# Patient Record
Sex: MALE | Race: WHITE | NOT HISPANIC OR LATINO | Employment: OTHER | URBAN - METROPOLITAN AREA
[De-identification: names, ages, dates, MRNs, and addresses within clinical notes are randomized per-mention and may not be internally consistent; named-entity substitution may affect disease eponyms.]

---

## 2018-05-21 PROCEDURE — 88305 TISSUE EXAM BY PATHOLOGIST: CPT | Performed by: PATHOLOGY

## 2018-05-22 ENCOUNTER — LAB REQUISITION (OUTPATIENT)
Dept: LAB | Facility: HOSPITAL | Age: 73
End: 2018-05-22
Payer: MEDICARE

## 2018-05-22 DIAGNOSIS — D48.5 NEOPLASM OF UNCERTAIN BEHAVIOR OF SKIN: ICD-10-CM

## 2018-06-18 PROCEDURE — 88305 TISSUE EXAM BY PATHOLOGIST: CPT | Performed by: PATHOLOGY

## 2018-06-19 ENCOUNTER — LAB REQUISITION (OUTPATIENT)
Dept: LAB | Facility: HOSPITAL | Age: 73
End: 2018-06-19
Payer: MEDICARE

## 2018-06-19 DIAGNOSIS — C44.41 BASAL CELL CARCINOMA OF SKIN OF SCALP AND NECK: ICD-10-CM

## 2019-09-25 ENCOUNTER — APPOINTMENT (OUTPATIENT)
Dept: LAB | Facility: HOSPITAL | Age: 74
End: 2019-09-25
Payer: MEDICARE

## 2019-09-25 ENCOUNTER — TRANSCRIBE ORDERS (OUTPATIENT)
Dept: ADMINISTRATIVE | Facility: HOSPITAL | Age: 74
End: 2019-09-25

## 2019-09-25 DIAGNOSIS — R33.8 BENIGN LOCALIZED HYPERPLASIA OF PROSTATE WITH URINARY RETENTION: ICD-10-CM

## 2019-09-25 DIAGNOSIS — R35.1 NOCTURIA: Primary | ICD-10-CM

## 2019-09-25 DIAGNOSIS — R35.1 NOCTURIA: ICD-10-CM

## 2019-09-25 DIAGNOSIS — E78.2 MIXED HYPERLIPIDEMIA: ICD-10-CM

## 2019-09-25 DIAGNOSIS — E11.9 TYPE 2 DIABETES MELLITUS WITHOUT COMPLICATION, WITHOUT LONG-TERM CURRENT USE OF INSULIN (HCC): ICD-10-CM

## 2019-09-25 DIAGNOSIS — N40.1 BENIGN LOCALIZED HYPERPLASIA OF PROSTATE WITH URINARY RETENTION: ICD-10-CM

## 2019-09-25 DIAGNOSIS — E03.9 HYPOTHYROIDISM, UNSPECIFIED TYPE: ICD-10-CM

## 2019-09-25 DIAGNOSIS — I10 ESSENTIAL HYPERTENSION: ICD-10-CM

## 2019-09-25 LAB
ALBUMIN SERPL BCP-MCNC: 3.4 G/DL (ref 3.5–5)
ALP SERPL-CCNC: 78 U/L (ref 46–116)
ALT SERPL W P-5'-P-CCNC: 41 U/L (ref 12–78)
ANION GAP SERPL CALCULATED.3IONS-SCNC: 5 MMOL/L (ref 4–13)
AST SERPL W P-5'-P-CCNC: 55 U/L (ref 5–45)
BASOPHILS # BLD AUTO: 0.04 THOUSANDS/ΜL (ref 0–0.1)
BASOPHILS NFR BLD AUTO: 1 % (ref 0–1)
BILIRUB SERPL-MCNC: 0.74 MG/DL (ref 0.2–1)
BUN SERPL-MCNC: 16 MG/DL (ref 5–25)
CALCIUM SERPL-MCNC: 8.8 MG/DL (ref 8.3–10.1)
CHLORIDE SERPL-SCNC: 110 MMOL/L (ref 100–108)
CHOLEST SERPL-MCNC: 139 MG/DL (ref 50–200)
CO2 SERPL-SCNC: 29 MMOL/L (ref 21–32)
CREAT SERPL-MCNC: 1.01 MG/DL (ref 0.6–1.3)
EOSINOPHIL # BLD AUTO: 0.34 THOUSAND/ΜL (ref 0–0.61)
EOSINOPHIL NFR BLD AUTO: 6 % (ref 0–6)
ERYTHROCYTE [DISTWIDTH] IN BLOOD BY AUTOMATED COUNT: 15.2 % (ref 11.6–15.1)
EST. AVERAGE GLUCOSE BLD GHB EST-MCNC: 154 MG/DL
GFR SERPL CREATININE-BSD FRML MDRD: 74 ML/MIN/1.73SQ M
GLUCOSE P FAST SERPL-MCNC: 142 MG/DL (ref 65–99)
HBA1C MFR BLD: 7 % (ref 4.2–6.3)
HCT VFR BLD AUTO: 38.6 % (ref 36.5–49.3)
HDLC SERPL-MCNC: 33 MG/DL (ref 40–60)
HGB BLD-MCNC: 12.4 G/DL (ref 12–17)
IMM GRANULOCYTES # BLD AUTO: 0.02 THOUSAND/UL (ref 0–0.2)
IMM GRANULOCYTES NFR BLD AUTO: 0 % (ref 0–2)
LDLC SERPL CALC-MCNC: 71 MG/DL (ref 0–100)
LYMPHOCYTES # BLD AUTO: 1.27 THOUSANDS/ΜL (ref 0.6–4.47)
LYMPHOCYTES NFR BLD AUTO: 24 % (ref 14–44)
MCH RBC QN AUTO: 31.2 PG (ref 26.8–34.3)
MCHC RBC AUTO-ENTMCNC: 32.1 G/DL (ref 31.4–37.4)
MCV RBC AUTO: 97 FL (ref 82–98)
MONOCYTES # BLD AUTO: 0.71 THOUSAND/ΜL (ref 0.17–1.22)
MONOCYTES NFR BLD AUTO: 13 % (ref 4–12)
NEUTROPHILS # BLD AUTO: 2.98 THOUSANDS/ΜL (ref 1.85–7.62)
NEUTS SEG NFR BLD AUTO: 56 % (ref 43–75)
NONHDLC SERPL-MCNC: 106 MG/DL
NRBC BLD AUTO-RTO: 0 /100 WBCS
PLATELET # BLD AUTO: 86 THOUSANDS/UL (ref 149–390)
PMV BLD AUTO: 11.2 FL (ref 8.9–12.7)
POTASSIUM SERPL-SCNC: 4.3 MMOL/L (ref 3.5–5.3)
PROT SERPL-MCNC: 7.3 G/DL (ref 6.4–8.2)
PSA SERPL-MCNC: 0.2 NG/ML (ref 0–4)
RBC # BLD AUTO: 3.97 MILLION/UL (ref 3.88–5.62)
SODIUM SERPL-SCNC: 144 MMOL/L (ref 136–145)
TRIGL SERPL-MCNC: 173 MG/DL
TSH SERPL DL<=0.05 MIU/L-ACNC: 3.51 UIU/ML (ref 0.36–3.74)
WBC # BLD AUTO: 5.36 THOUSAND/UL (ref 4.31–10.16)

## 2019-09-25 PROCEDURE — 85025 COMPLETE CBC W/AUTO DIFF WBC: CPT

## 2019-09-25 PROCEDURE — 84443 ASSAY THYROID STIM HORMONE: CPT

## 2019-09-25 PROCEDURE — 80053 COMPREHEN METABOLIC PANEL: CPT

## 2019-09-25 PROCEDURE — 83036 HEMOGLOBIN GLYCOSYLATED A1C: CPT

## 2019-09-25 PROCEDURE — 84153 ASSAY OF PSA TOTAL: CPT

## 2019-09-25 PROCEDURE — 80061 LIPID PANEL: CPT

## 2019-09-25 PROCEDURE — 36415 COLL VENOUS BLD VENIPUNCTURE: CPT

## 2021-03-10 ENCOUNTER — IMMUNIZATIONS (OUTPATIENT)
Dept: FAMILY MEDICINE CLINIC | Facility: HOSPITAL | Age: 76
End: 2021-03-10

## 2021-03-10 DIAGNOSIS — Z23 ENCOUNTER FOR IMMUNIZATION: Primary | ICD-10-CM

## 2021-03-10 PROCEDURE — 0011A SARS-COV-2 / COVID-19 MRNA VACCINE (MODERNA) 100 MCG: CPT

## 2021-03-10 PROCEDURE — 91301 SARS-COV-2 / COVID-19 MRNA VACCINE (MODERNA) 100 MCG: CPT

## 2021-04-07 ENCOUNTER — IMMUNIZATIONS (OUTPATIENT)
Dept: FAMILY MEDICINE CLINIC | Facility: HOSPITAL | Age: 76
End: 2021-04-07

## 2021-04-07 DIAGNOSIS — Z23 ENCOUNTER FOR IMMUNIZATION: Primary | ICD-10-CM

## 2021-04-07 PROCEDURE — 91301 SARS-COV-2 / COVID-19 MRNA VACCINE (MODERNA) 100 MCG: CPT

## 2021-04-07 PROCEDURE — 0012A SARS-COV-2 / COVID-19 MRNA VACCINE (MODERNA) 100 MCG: CPT

## 2021-09-19 ENCOUNTER — APPOINTMENT (EMERGENCY)
Dept: CT IMAGING | Facility: HOSPITAL | Age: 76
DRG: 087 | End: 2021-09-19
Payer: MEDICARE

## 2021-09-19 ENCOUNTER — HOSPITAL ENCOUNTER (INPATIENT)
Facility: HOSPITAL | Age: 76
LOS: 1 days | Discharge: HOME HEALTH | DRG: 087 | End: 2021-09-19
Attending: SURGERY | Admitting: SURGERY
Payer: MEDICARE

## 2021-09-19 ENCOUNTER — HOSPITAL ENCOUNTER (EMERGENCY)
Facility: HOSPITAL | Age: 76
DRG: 087 | End: 2021-09-19
Attending: EMERGENCY MEDICINE | Admitting: EMERGENCY MEDICINE
Payer: MEDICARE

## 2021-09-19 ENCOUNTER — APPOINTMENT (INPATIENT)
Dept: RADIOLOGY | Facility: HOSPITAL | Age: 76
DRG: 087 | End: 2021-09-19
Payer: MEDICARE

## 2021-09-19 ENCOUNTER — APPOINTMENT (EMERGENCY)
Dept: RADIOLOGY | Facility: HOSPITAL | Age: 76
DRG: 087 | End: 2021-09-19
Payer: MEDICARE

## 2021-09-19 VITALS
SYSTOLIC BLOOD PRESSURE: 166 MMHG | DIASTOLIC BLOOD PRESSURE: 72 MMHG | RESPIRATION RATE: 26 BRPM | TEMPERATURE: 100.4 F | OXYGEN SATURATION: 96 % | HEART RATE: 71 BPM
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
## 2021-09-19 DIAGNOSIS — I60.9 SAH (SUBARACHNOID HEMORRHAGE) (HCC): Primary | ICD-10-CM

## 2021-09-19 DIAGNOSIS — S06.5X9A SUBDURAL HEMATOMA (HCC): Primary | ICD-10-CM

## 2021-09-19 DIAGNOSIS — I60.9 SAH (SUBARACHNOID HEMORRHAGE) (HCC): ICD-10-CM

## 2021-09-19 DIAGNOSIS — S06.5X9A SDH (SUBDURAL HEMATOMA) (HCC): ICD-10-CM

## 2021-09-19 PROBLEM — S00.83XA FACIAL CONTUSION, INITIAL ENCOUNTER: Status: ACTIVE | Noted: 2021-09-19

## 2021-09-19 PROBLEM — T14.8XXA MULTIPLE SKIN TEARS: Status: ACTIVE | Noted: 2021-09-19

## 2021-09-19 PROBLEM — T07.XXXA ABRASIONS OF MULTIPLE SITES: Status: ACTIVE | Noted: 2021-09-19

## 2021-09-19 LAB
ANION GAP SERPL CALCULATED.3IONS-SCNC: 8 MMOL/L (ref 4–13)
APTT PPP: 36 SECONDS (ref 23–37)
ATRIAL RATE: 72 BPM
BASOPHILS # BLD AUTO: 0.1 THOUSANDS/ΜL (ref 0–0.1)
BASOPHILS NFR BLD AUTO: 1 % (ref 0–2)
BUN SERPL-MCNC: 40 MG/DL (ref 7–25)
CALCIUM SERPL-MCNC: 9.3 MG/DL (ref 8.6–10.5)
CHLORIDE SERPL-SCNC: 98 MMOL/L (ref 98–107)
CO2 SERPL-SCNC: 28 MMOL/L (ref 21–31)
CREAT SERPL-MCNC: 1.75 MG/DL (ref 0.7–1.3)
EOSINOPHIL # BLD AUTO: 0.1 THOUSAND/ΜL (ref 0–0.61)
EOSINOPHIL NFR BLD AUTO: 1 % (ref 0–5)
ERYTHROCYTE [DISTWIDTH] IN BLOOD BY AUTOMATED COUNT: 18.3 % (ref 11.5–14.5)
GFR SERPL CREATININE-BSD FRML MDRD: 38 ML/MIN/1.73SQ M
GLUCOSE SERPL-MCNC: 147 MG/DL (ref 65–99)
HCT VFR BLD AUTO: 28.5 % (ref 42–47)
HGB BLD-MCNC: 9 G/DL (ref 14–18)
INR PPP: 1.15 (ref 0.84–1.19)
LYMPHOCYTES # BLD AUTO: 0.6 THOUSANDS/ΜL (ref 0.6–4.47)
LYMPHOCYTES NFR BLD AUTO: 8 % (ref 21–51)
MCH RBC QN AUTO: 25.8 PG (ref 26–34)
MCHC RBC AUTO-ENTMCNC: 31.5 G/DL (ref 31–37)
MCV RBC AUTO: 82 FL (ref 81–99)
MONOCYTES # BLD AUTO: 1.1 THOUSAND/ΜL (ref 0.17–1.22)
MONOCYTES NFR BLD AUTO: 13 % (ref 2–12)
NEUTROPHILS # BLD AUTO: 6.4 THOUSANDS/ΜL (ref 1.4–6.5)
NEUTS SEG NFR BLD AUTO: 77 % (ref 42–75)
P AXIS: 23 DEGREES
PLATELET # BLD AUTO: 234 THOUSANDS/UL (ref 149–390)
PMV BLD AUTO: 7.3 FL (ref 8.6–11.7)
POTASSIUM SERPL-SCNC: 4.6 MMOL/L (ref 3.5–5.5)
PR INTERVAL: 186 MS
PROTHROMBIN TIME: 14.8 SECONDS (ref 11.6–14.5)
QRS AXIS: -46 DEGREES
QRSD INTERVAL: 104 MS
QT INTERVAL: 444 MS
QTC INTERVAL: 486 MS
RBC # BLD AUTO: 3.47 MILLION/UL (ref 4.3–5.9)
SODIUM SERPL-SCNC: 134 MMOL/L (ref 134–143)
T WAVE AXIS: 23 DEGREES
TROPONIN I SERPL-MCNC: <0.03 NG/ML
VENTRICULAR RATE: 72 BPM
WBC # BLD AUTO: 8.2 THOUSAND/UL (ref 4.8–10.8)

## 2021-09-19 PROCEDURE — 97162 PT EVAL MOD COMPLEX 30 MIN: CPT

## 2021-09-19 PROCEDURE — 99234 HOSP IP/OBS SM DT SF/LOW 45: CPT | Performed by: SURGERY

## 2021-09-19 PROCEDURE — 97116 GAIT TRAINING THERAPY: CPT

## 2021-09-19 PROCEDURE — 36415 COLL VENOUS BLD VENIPUNCTURE: CPT | Performed by: EMERGENCY MEDICINE

## 2021-09-19 PROCEDURE — 70450 CT HEAD/BRAIN W/O DYE: CPT

## 2021-09-19 PROCEDURE — 93005 ELECTROCARDIOGRAM TRACING: CPT

## 2021-09-19 PROCEDURE — 93010 ELECTROCARDIOGRAM REPORT: CPT | Performed by: INTERNAL MEDICINE

## 2021-09-19 PROCEDURE — NC001 PR NO CHARGE: Performed by: SURGERY

## 2021-09-19 PROCEDURE — 72125 CT NECK SPINE W/O DYE: CPT

## 2021-09-19 PROCEDURE — 85730 THROMBOPLASTIN TIME PARTIAL: CPT | Performed by: EMERGENCY MEDICINE

## 2021-09-19 PROCEDURE — 99222 1ST HOSP IP/OBS MODERATE 55: CPT | Performed by: PHYSICIAN ASSISTANT

## 2021-09-19 PROCEDURE — 90715 TDAP VACCINE 7 YRS/> IM: CPT | Performed by: EMERGENCY MEDICINE

## 2021-09-19 PROCEDURE — 71045 X-RAY EXAM CHEST 1 VIEW: CPT

## 2021-09-19 PROCEDURE — 85610 PROTHROMBIN TIME: CPT | Performed by: EMERGENCY MEDICINE

## 2021-09-19 PROCEDURE — 84484 ASSAY OF TROPONIN QUANT: CPT | Performed by: EMERGENCY MEDICINE

## 2021-09-19 PROCEDURE — 12011 RPR F/E/E/N/L/M 2.5 CM/<: CPT | Performed by: EMERGENCY MEDICINE

## 2021-09-19 PROCEDURE — 85025 COMPLETE CBC W/AUTO DIFF WBC: CPT | Performed by: EMERGENCY MEDICINE

## 2021-09-19 PROCEDURE — 80048 BASIC METABOLIC PNL TOTAL CA: CPT | Performed by: EMERGENCY MEDICINE

## 2021-09-19 PROCEDURE — 99285 EMERGENCY DEPT VISIT HI MDM: CPT | Performed by: EMERGENCY MEDICINE

## 2021-09-19 PROCEDURE — 99285 EMERGENCY DEPT VISIT HI MDM: CPT

## 2021-09-19 RX ORDER — ACETAMINOPHEN 325 MG/1
650 TABLET ORAL EVERY 4 HOURS PRN
Refills: 0
Start: 2021-09-19

## 2021-09-19 RX ORDER — DOCUSATE SODIUM 100 MG/1
100 CAPSULE, LIQUID FILLED ORAL 2 TIMES DAILY
COMMUNITY

## 2021-09-19 RX ORDER — LEVETIRACETAM 500 MG/1
500 TABLET ORAL 2 TIMES DAILY
Status: DISCONTINUED | OUTPATIENT
Start: 2021-09-19 | End: 2021-09-19 | Stop reason: HOSPADM

## 2021-09-19 RX ORDER — ACETAMINOPHEN 325 MG/1
650 TABLET ORAL EVERY 4 HOURS PRN
Status: DISCONTINUED | OUTPATIENT
Start: 2021-09-19 | End: 2021-09-19 | Stop reason: HOSPADM

## 2021-09-19 RX ORDER — MULTIVIT WITH MINERALS/LUTEIN
1000 TABLET ORAL DAILY
COMMUNITY

## 2021-09-19 RX ORDER — ATORVASTATIN CALCIUM 20 MG/1
20 TABLET, FILM COATED ORAL DAILY
COMMUNITY

## 2021-09-19 RX ORDER — ASPIRIN 81 MG/1
81 TABLET ORAL DAILY
COMMUNITY
End: 2021-09-19 | Stop reason: HOSPADM

## 2021-09-19 RX ORDER — ONDANSETRON 2 MG/ML
4 INJECTION INTRAMUSCULAR; INTRAVENOUS EVERY 6 HOURS PRN
Status: DISCONTINUED | OUTPATIENT
Start: 2021-09-19 | End: 2021-09-19 | Stop reason: HOSPADM

## 2021-09-19 RX ORDER — PANTOPRAZOLE SODIUM 40 MG/1
40 TABLET, DELAYED RELEASE ORAL DAILY
COMMUNITY

## 2021-09-19 RX ORDER — ACETAMINOPHEN 325 MG/1
650 TABLET ORAL EVERY 4 HOURS PRN
Refills: 0 | Status: CANCELLED
Start: 2021-09-19

## 2021-09-19 RX ORDER — LEVETIRACETAM 500 MG/1
500 TABLET ORAL 2 TIMES DAILY
Qty: 12 TABLET | Refills: 0 | OUTPATIENT
Start: 2021-09-19 | End: 2021-12-22

## 2021-09-19 RX ORDER — GINSENG 100 MG
1 CAPSULE ORAL ONCE
Status: COMPLETED | OUTPATIENT
Start: 2021-09-19 | End: 2021-09-19

## 2021-09-19 RX ORDER — LEVOTHYROXINE SODIUM 112 UG/1
112 TABLET ORAL DAILY
COMMUNITY

## 2021-09-19 RX ORDER — FUROSEMIDE 40 MG/1
40 TABLET ORAL DAILY
COMMUNITY
End: 2021-12-22

## 2021-09-19 RX ORDER — ESCITALOPRAM OXALATE 10 MG/1
10 TABLET ORAL DAILY
COMMUNITY

## 2021-09-19 RX ORDER — LIDOCAINE HYDROCHLORIDE AND EPINEPHRINE 10; 10 MG/ML; UG/ML
10 INJECTION, SOLUTION INFILTRATION; PERINEURAL ONCE
Status: COMPLETED | OUTPATIENT
Start: 2021-09-19 | End: 2021-09-19

## 2021-09-19 RX ORDER — SPIRONOLACTONE 100 MG/1
100 TABLET, FILM COATED ORAL DAILY
COMMUNITY
End: 2021-12-22

## 2021-09-19 RX ORDER — FERROUS SULFATE 325(65) MG
325 TABLET ORAL
COMMUNITY

## 2021-09-19 RX ORDER — METOPROLOL SUCCINATE 100 MG/1
100 TABLET, EXTENDED RELEASE ORAL DAILY
COMMUNITY

## 2021-09-19 RX ADMIN — LEVETIRACETAM 500 MG: 500 TABLET, FILM COATED ORAL at 17:39

## 2021-09-19 RX ADMIN — TETANUS TOXOID, REDUCED DIPHTHERIA TOXOID AND ACELLULAR PERTUSSIS VACCINE, ADSORBED 0.5 ML: 5; 2.5; 8; 8; 2.5 SUSPENSION INTRAMUSCULAR at 01:11

## 2021-09-19 RX ADMIN — BACITRACIN 1 LARGE APPLICATION: 500 OINTMENT TOPICAL at 03:06

## 2021-09-19 RX ADMIN — SODIUM CHLORIDE 1000 ML: 0.9 INJECTION, SOLUTION INTRAVENOUS at 02:19

## 2021-09-19 RX ADMIN — Medication 30.8 MCG: at 02:18

## 2021-09-19 RX ADMIN — LEVETIRACETAM 500 MG: 500 TABLET, FILM COATED ORAL at 08:39

## 2021-09-19 RX ADMIN — LIDOCAINE HYDROCHLORIDE,EPINEPHRINE BITARTRATE 10 ML: 10; .01 INJECTION, SOLUTION INFILTRATION; PERINEURAL at 01:11

## 2021-09-19 NOTE — ASSESSMENT & PLAN NOTE
- Multiple skin tears following fall   - Local wound care as indicated  - Analgesia as needed  - Ice for pain and swelling

## 2021-09-19 NOTE — ASSESSMENT & PLAN NOTE
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, present on admission   - Neurosurgery evaluation and recommendations appreciated  No further workup or intervention necessary   - Hold anti-platelet and anticoagulant medications for least 2 weeks and/or until cleared by Neurosurgery  - Continue Keppra for 7 days for seizure prophylaxis  - Monitor neurologic exam   - Continue symptomatic management with analgesia as needed  - Repeat CT scan from 09/19/2021 reviewed  - PT and OT evaluation and treatment as indicated  Recommended home health therapy services and a roller walker   - Outpatient Neurosurgery follow-up in 2 weeks with repeat CT scan of the head prior to follow-up appointment

## 2021-09-19 NOTE — CASE MANAGEMENT
JUDY Dent informed CM that pt needs a walker, and home PT/OT      CM placed order via [employer]     [facility] is able to accept [facility] Thursday

## 2021-09-19 NOTE — ASSESSMENT & PLAN NOTE
- Abrasions of multiple sites including on the face, bilateral upper extremities, and bilateral lower extremities  - Local wound care as indicated  - Analgesia as needed  - Ice for pain and swelling

## 2021-09-19 NOTE — ED PROVIDER NOTES
Emergency Department Trauma Note  Charles Cuello  78 y o  male MRN: [id number]  Unit/Bed#: ED 10/ED 10 Encounter: [id number]      Trauma Alert: Trauma Acuity: Trauma Evaluation  Model of Arrival: Mode of Arrival:  (self) via    Trauma Team: Current Providers  Attending Provider: Christiano Cochran MD  Registered Nurse: Mireya Dodd  Consultants: None      History of Present Illness     Chief Complaint:   Chief Complaint   Patient presents with    Fall     HPI:  Charles Cuello  is a 78 y o  male who presents with   Mechanism:Details of Incident: Pt fell Injury Date: 09/19/21 Injury Time: 2230      Patient is a 81-year-old male with history of known VAZQUEZ cirrhosis, recent admission to [facility] for multiple rib fractures in August and fall that presents for evaluation after fall  Patient says that he got out of his recliner about 1/2 hour prior to ED arrival and he was walking on a floor where he tripped and lost his balance falling forward hitting his head on the ground  He has a laceration to his forehead noted  Patient denies loss of conscious but endorses moderate dull/achy generalized headache  No leave VA or exacerbating factors  Patient has not taken anything for headache  Patient on 81 mg aspirin, may trauma evaluation  Otherwise, patient complains of "neck stiffness"  He is adamant that he does not have any chest pain, dyspnea, abdominal pain  He has been ambulatory despite the fall  He does have a skin tear to his left knee noted  Unknown last tetanus  Nancy Mesha presents with patient says that he is acting at his baseline  Patient denies nausea vomiting, altered mental status, focal neurologic deficit  Patient denied all abdominal pain did not have any abdominal tenderness on exam   Fast positive secondary to history of cirrhosis, did not pursue abdominal imaging as there was no indication  Review of Systems   Constitutional: Negative for fever     HENT: Negative for sore throat  Eyes: Negative for photophobia  Respiratory: Negative for shortness of breath  Cardiovascular: Negative for chest pain  Gastrointestinal: Negative for abdominal pain  Genitourinary: Negative for dysuria  Musculoskeletal: Negative for back pain  Forehead lac     Skin: Negative for rash  Neurological: Positive for headaches  Negative for light-headedness  Hematological: Negative for adenopathy  Psychiatric/Behavioral: Negative for agitation  All other systems reviewed and are negative  Historical Information     Immunizations:   Immunization History   Administered Date(s) Administered    SARS-CoV-2 / COVID-19 mRNA IM (Moderna) 03/10/2021, 04/07/2021    Tdap 09/19/2021       History reviewed  No pertinent past medical history  History reviewed  No pertinent family history  History reviewed  No pertinent surgical history  Social History     Tobacco Use    Smoking status: Never Smoker    Smokeless tobacco: Never Used   Substance Use Topics    Alcohol use: Not Currently     Comment: Social    Drug use: Never     E-Cigarette/Vaping     E-Cigarette/Vaping Substances       Family History: non-contributory    Meds/Allergies   None       No Known Allergies    PHYSICAL EXAM    PE limited by: none    Objective   Vitals:   First set: Temperature: 97 9 °F (36 6 °C) (09/19/21 0018)  Pulse: 69 (09/19/21 0018)  Respirations: 18 (09/19/21 0018)  Blood Pressure: 120/58 (09/19/21 0018)  SpO2: 94 % (09/19/21 0018)    Primary Survey:   (A) Airway:  Intact  (B) Breathing:  Bilateral breath sounds  (C) Circulation: Pulses:   normal  (D) Disabliity:  GCS Total:  15  (E) Expose:  Completed    Secondary Survey: (Click on Physical Exam tab above)  Physical Exam  Vitals reviewed  Constitutional:       General: He is not in acute distress  Appearance: He is well-developed  HENT:      Head: Normocephalic  Eyes:      Pupils: Pupils are equal, round, and reactive to light     Neck: Comments: Paracervical tenderness without bony tenderness noted  Cardiovascular:      Rate and Rhythm: Normal rate and regular rhythm  Heart sounds: Normal heart sounds  No murmur heard  No friction rub  No gallop  Pulmonary:      Effort: Pulmonary effort is normal       Breath sounds: Normal breath sounds  Comments: Lungs are clear bilaterally  Abdominal:      General: Bowel sounds are normal  There is no distension  Palpations: Abdomen is soft  Tenderness: There is no abdominal tenderness  There is no guarding  Comments: Abdomen is soft, nondistended, nontender  No rebound tenderness or guarding is noted  No masses palpated  Normal bowel sounds  Musculoskeletal:         General: Normal range of motion  Cervical back: Normal range of motion and neck supple  Comments: Skin tear left knee, full range of motion without tenderness    Approximately 2 5 cm laceration on the forehead   Skin:     Capillary Refill: Capillary refill takes less than 2 seconds  Neurological:      Mental Status: He is alert and oriented to person, place, and time  Cranial Nerves: No cranial nerve deficit  Sensory: No sensory deficit  Motor: No abnormal muscle tone  Comments: Alert oriented x3  GCS 15  Cranial nerves 2-12 intact  Strength 5/5 in all 4 extremities  Sensation intact throughout  Psychiatric:         Behavior: Behavior normal          Thought Content: Thought content normal          Judgment: Judgment normal          Cervical spine cleared by clinical criteria?  No (imaging required)      Invasive Devices     Peripheral Intravenous Line            Peripheral IV 09/19/21 Left Antecubital <1 day                Lab Results:   Results Reviewed     Procedure Component Value Units Date/Time    Troponin I [[id number]]  (Normal) Collected: 09/19/21 0102    Lab Status: Final result Specimen: Blood from Arm, Right Updated: 09/19/21 0131     Troponin I <0 03 ng/mL Basic metabolic panel [[id number]]  (Abnormal) Collected: 09/19/21 0102    Lab Status: Final result Specimen: Blood from Arm, Right Updated: 09/19/21 0130     Sodium 134 mmol/L      Potassium 4 6 mmol/L      Chloride 98 mmol/L      CO2 28 mmol/L      ANION GAP 8 mmol/L      BUN 40 mg/dL      Creatinine 1 75 mg/dL      Glucose 147 mg/dL      Calcium 9 3 mg/dL      eGFR 38 ml/min/1 73sq m     Narrative:      [place] guidelines for Chronic Kidney Disease (CKD):     Stage 1 with normal or high GFR (GFR > 90 mL/min/1 73 square meters)    Stage 2 Mild CKD (GFR = 60-89 mL/min/1 73 square meters)    Stage 3A Moderate CKD (GFR = 45-59 mL/min/1 73 square meters)    Stage 3B Moderate CKD (GFR = 30-44 mL/min/1 73 square meters)    Stage 4 Severe CKD (GFR = 15-29 mL/min/1 73 square meters)    Stage 5 End Stage CKD (GFR <15 mL/min/1 73 square meters)  Note: GFR calculation is accurate only with a steady state creatinine    Protime-INR [[id number]]  (Abnormal) Collected: 09/19/21 0102    Lab Status: Final result Specimen: Blood from Arm, Right Updated: 09/19/21 0125     Protime 14 8 seconds      INR 1 15    APTT [[id number]]  (Normal) Collected: 09/19/21 0102    Lab Status: Final result Specimen: Blood from Arm, Right Updated: 09/19/21 0125     PTT 36 seconds     CBC and differential [[id number]]  (Abnormal) Collected: 09/19/21 0102    Lab Status: Final result Specimen: Blood from Arm, Right Updated: 09/19/21 0113     WBC 8 20 Thousand/uL      RBC 3 47 Million/uL      Hemoglobin 9 0 g/dL      Hematocrit 28 5 %      MCV 82 fL      MCH 25 8 pg      MCHC 31 5 g/dL      RDW 18 3 %      MPV 7 3 fL      Platelets 461 Thousands/uL      Neutrophils Relative 77 %      Lymphocytes Relative 8 %      Monocytes Relative 13 %      Eosinophils Relative 1 %      Basophils Relative 1 %      Neutrophils Absolute 6 40 Thousands/µL      Lymphocytes Absolute 0 60 Thousands/µL      Monocytes Absolute 1 10 Thousand/µL Eosinophils Absolute 0 10 Thousand/µL      Basophils Absolute 0 10 Thousands/µL                  Imaging Studies:   Direct to CT: No  TRAUMA - CT head wo contrast   Final Result by Ti Szymanski MD (09/19 0125)      There is acute subdural hemorrhage along the left anterior falx, measuring up to 4 mm thickness  There is also a small amount of posttraumatic subarachnoid hemorrhage  Follow-up as clinically indicated  I personally discussed this study with Narinder Diaz on 9/19/2021 at 1:05 AM                            Workstation performed: [id number]         TRAUMA - CT spine cervical wo contrast   Final Result by Ti Szymanski MD (09/19 5740)      No acute cervical spine fracture or traumatic malalignment  Moderate multilevel degenerative changes are present  Workstation performed: [id number]         XR Trauma chest portable    (Results Pending)         Procedures  ECG 12 Lead Documentation Only    Date/Time: 9/19/2021 5:19 AM  Performed by: Brittnee Burden MD  Authorized by: Brittnee Burden MD     ECG reviewed by me, the ED Provider: yes    Patient location:  ED  Previous ECG:     Previous ECG:  Unavailable    Comparison to cardiac monitor: Yes    Interpretation:     Interpretation: non-specific    Rate:     ECG rate assessment: normal    Rhythm:     Rhythm: sinus rhythm    Ectopy:     Ectopy: none    QRS:     QRS axis:  Left    QRS intervals:  Normal  Conduction:     Conduction: normal    ST segments:     ST segments:  Normal  T waves:     T waves: normal    Laceration repair    Date/Time: 9/19/2021 1:00 AM  Performed by: Brittnee Burden MD  Authorized by: Brittnee Burden MD   Consent: Verbal consent obtained    Risks and benefits: risks, benefits and alternatives were discussed  Consent given by: patient  Required items: required blood products, implants, devices, and special equipment available  Patient identity confirmed: verbally with patient and arm band  Body area: head/neck  Location details: forehead  Laceration length: 2 5 cm  Foreign bodies: no foreign bodies  Tendon involvement: none  Nerve involvement: none  Vascular damage: no  Anesthesia: local infiltration    Anesthesia:  Local Anesthetic: lidocaine 1% with epinephrine  Anesthetic total: 5 mL    Sedation:  Patient sedated: no      Wound Dehiscence:  Superficial Wound Dehiscence: simple closure      Procedure Details:  Preparation: Patient was prepped and draped in the usual sterile fashion    Irrigation solution: saline  Irrigation method: syringe  Amount of cleaning: standard  Debridement: none  Degree of undermining: none  Skin closure: Ethilon  Number of sutures: 5  Technique: simple  Approximation: close  Approximation difficulty: simple  Dressing: antibiotic ointment  Patient tolerance: Patient tolerated the procedure well with no immediate complications    POC FAST US    Date/Time: 9/19/2021 5:23 AM  Performed by: Natalie Lucas MD  Authorized by: Natalie Lucas MD     Patient location:  ED  Other Assisting Provider: No    Procedure details:     Exam Type:  Diagnostic    Indications: blunt abdominal trauma      Assess for:  Intra-abdominal fluid and pericardial effusion    Technique: FAST      Views obtained:  Heart - Pericardial sac, RUQ - Rueda's Pouch, LUQ - Splenorenal space and Suprapubic - Pouch of Maximiliano    Image quality: diagnostic      Image availability:  Not saved  FAST Findings:     RUQ (Hepatorenal) free fluid: trace      LUQ (Splenorenal) free fluid: trace      Suprapubic free fluid: trace      Cardiac wall motion: identified      Pericardial effusion: absent    Interpretation:     Impressions: positive      Positive findings: fluid in peritoneal space    Comments:      False-positive, known cirrhotic             ED Course           MDM  Number of Diagnoses or Management Options  SAH (subarachnoid hemorrhage) (HCC)  Subdural hematoma ([facility] [address] )  Diagnosis management comments: Patient is a 79-year-old male who presents with fall on aspirin found have subarachnoid hemorrhage and subdural hematoma  Neurologically intact  Given DDAVP to reverse aspirin  Otherwise forehead laceration repaired here in the emergency department  No evidence of injury otherwise  Fast was performed but was not taken into account is the patient is known cirrhotic with ascites with recent paracentesis  Disposition  [facility] Transfer: No  Final diagnoses:   Subdural hematoma ([address] )   SAH (subarachnoid hemorrhage) ([facility] [address] )     Time reflects when diagnosis was documented in both MDM as applicable and the Disposition within this note     Time User Action Codes Description Comment    9/19/2021  1:39 AM Joey Lopez Add [S06 5X9A] Subdural hematoma ([facility] [address] )     9/19/2021  1:39 AM Alexandrea Olvera Add [I60 9] SAH (subarachnoid hemorrhage) [facility])       ED Disposition     ED Disposition Condition Date/Time Comment    Transfer to Another Facility-In Network  [place] Sep 19, 2021  1:39 AM Ignacia Shall  should be transferred out to [facility] Documentation      Most Recent Value   Patient Condition  The patient has been stabilized such that within reasonable medical probability, no material deterioration of the patient condition or the condition of the unborn child(domitila) is likely to result from the transfer   Reason for Transfer  Level of Care needed not available at this facility   Benefits of Transfer  Specialized equipment and/or services available at the receiving facility (Include comment)________________________ [Urology with OR ]   Risks of Transfer  Potential for delay in receiving treatment, Potential deterioration of medical condition, Loss of IV, Increased discomfort during transfer, Possible worsening of condition or death during transfer   Accepting Physician  Romana Hopson [address] Name, [address] [facility] MD Olvera    Provider Certification  General risk, such as traffic hazards, adverse weather conditions, rough terrain or turbulence, possible failure of equipment (including vehicle or aircraft), or consequences of actions of persons outside the control of the transport personnel      RN Documentation      [address] Name, [place]    SLB      Follow-up Information    None       There are no discharge medications for this patient  No discharge procedures on file      PDMP Review     None          ED Provider  Electronically Signed by         Jose Whyte MD  09/19/21 9014

## 2021-09-19 NOTE — DISCHARGE SUMMARY
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, present on admission   - Neurosurgery evaluation and recommendations appreciated  No further workup or intervention necessary   - Hold anti-platelet and anticoagulant medications for least 2 weeks and/or until cleared by Neurosurgery  - Continue Keppra for 7 days for seizure prophylaxis  - Monitor neurologic exam   - Continue symptomatic management with analgesia as needed  - Repeat CT scan from 09/19/2021 reviewed  - PT and OT evaluation and treatment as indicated  Recommended home health therapy services and a roller walker   - Outpatient Neurosurgery follow-up in 2 weeks with repeat CT scan of the head prior to follow-up appointment  SAH (subarachnoid hemorrhage) (HCC)  Assessment & Plan  - Traumatic brain injury with trace acute subarachnoid hemorrhage in the left paramedian frontal region, present on admission   - Neurosurgery evaluation and recommendations appreciated  No further workup or intervention necessary   - Hold anti-platelet and anticoagulant medications for least 2 weeks and/or until cleared by Neurosurgery  - Continue Keppra for 7 days for seizure prophylaxis  - Monitor neurologic exam   - Continue symptomatic management with analgesia as needed  - Repeat CT scan from 09/19/2021 reviewed    Trace subarachnoid hemorrhage resolved  - PT and OT evaluation and treatment as indicated  Recommended home health therapy services and a roller walker   - Outpatient Neurosurgery follow-up in 2 weeks with repeat CT scan of the head prior to follow-up appointment  Facial contusion, initial encounter  Assessment & Plan  - Facial contusion and forehead abrasion, present on admission   - Local wound care as indicated  - Analgesia as needed  - Ice for pain and swelling  Abrasions of multiple sites  Assessment & Plan  - Abrasions of multiple sites including on the face, bilateral upper extremities, and bilateral lower extremities  - Local wound care as indicated  - Analgesia as needed  - Ice for pain and swelling  Multiple skin tears  Assessment & Plan  - Multiple skin tears following fall   - Local wound care as indicated  - Analgesia as needed  - Ice for pain and swelling  Discharge Summary - Trauma Service   Hunter Avelar  78 y o  male MRN: [id number]  Unit/Bed#: [facility] 822-01 Encounter: [id number]    Admission Date: 9/19/2021     Discharge Date: 9/19/2021    Admitting Diagnosis: Multiple injuries [T07  XXXA]  SAH (subarachnoid hemorrhage) ([facility] [address] ) [I60 9]    Discharge Diagnosis: See above  Attending and Service: Dr Mary Clifton, Acute Care Surgical Services  Consulting Physician(s):     1  Neurosurgery  2  Geriatric Medicine  Imaging and Procedures Performed:     XR Trauma chest portable    Result Date: 9/19/2021  Impression: No acute cardiopulmonary disease  Workstation performed: [id number]     CT head wo contrast    Result Date: 9/19/2021  Impression: Unchanged small left parafalcine acute subdural hematoma  Resolved trace subarachnoid hemorrhage in left paramedian frontal region  Unchanged mild ventriculomegaly out of proportion to the degree of cerebral atrophy Slightly increased size of small subgaleal hematoma in right frontal region    Workstation performed: [id number]     TRAUMA - CT head wo contrast    Result Date: 9/19/2021  Impression: There is acute subdural hemorrhage along the left anterior falx, measuring up to 4 mm thickness  There is also a small amount of posttraumatic subarachnoid hemorrhage  Follow-up as clinically indicated  I personally discussed this study with Logan Miller on 9/19/2021 at 1:05 AM  Workstation performed: [id number]     TRAUMA - CT spine cervical wo contrast    Result Date: 9/19/2021  Impression: No acute cervical spine fracture or traumatic malalignment  Moderate multilevel degenerative changes are present  Workstation performed: [id number]       Hospital Course: Terry Acosta  is a 82-year-old male who initially presented to [facility] for evaluation following fall  He was found to have traumatic brain injuries including subdural hematoma and subarachnoid hemorrhage and was transferred to [facility] for trauma neurosurgical evaluation  On his initial evaluation by the trauma service at [facility], his primary survey was unremarkable  On secondary survey, he was afebrile with normal vital signs other than mild bradycardia; he had bruising over the right side of his face as well as a laceration on the right forehead  His initial workup including labs in the above-noted imaging studies was reviewed by the trauma service at [facility]  He did receive DDAVP for correction of his aspirin prior to transfer  He was admitted to the trauma service at [facility] following fall with multiple traumatic injuries including traumatic brain injuries involving subdural hematoma and subarachnoid hemorrhage, skin tears and abrasions of multiple sites, facial contusion, platelet dysfunction secondary to aspirin use, and multiple comorbidities including VAZQUEZ cirrhosis  He was allowed a diet as tolerated, placed on HOT protocol with q 1 hour neurologic checks and Keppra for seizure prophylaxis  Neurosurgery was consulted and recommended non operative management with repeat CT scan of the head and avoidance of anti-platelet and anticoagulant medications for least 2 weeks  He did undergo repeat CT scan of the head on 09/19/2021 with the above-noted results  He remained neurologically intact and Neurosurgery felt as though he needed no further workup or observation following stable repeat CT scan of the head  PT and OT evaluated him and cleared for discharge home with family support and home health therapy services as well as a roller walker  Case Management assisted with disposition planning  The patient was deemed stable for discharge on 09/19/2021  On discharge, the patient is instructed to follow-up with the patient's primary care provider to review the events of the patient's recent hospitalization  The patient is instructed to follow-up in the Trauma Clinic as needed  The patient is instructed to follow up with Neurosurgery in 2 weeks with repeat CT scan of the head prior to follow-up  The patient should follow the provided discharge instructions  Condition at Discharge: good     Discharge instructions/Information to patient and family:   See after visit summary for information provided to patient and family  Provisions for Follow-Up Care:  See after visit summary for information related to follow-up care and any pertinent home health orders  Disposition: See After Visit Summary for discharge disposition information  Planned Readmission: No    Discharge Statement   I spent 24 minutes discharging the patient  This time was spent on the day of discharge  I had direct contact with the patient on the day of discharge  Additional documentation is required if more than 30 minutes were spent on discharge  Discharge Medications:  See after visit summary for reconciled discharge medications provided to patient and family        Madi Espinal PA-C  9/19/2021  3:50 PM

## 2021-09-19 NOTE — EMTALA/ACUTE CARE TRANSFER
[address] [facility]  EMERGENCY DEPARTMENT  [address] [address]  [phone]  Dept: [phone]      [address] TRANSFER CONSENT    NAME Nikolas BEAULIEU 1945                              MRN [id number]    I have been informed of my rights regarding examination, treatment, and transfer   by Dr Fidel Traore MD    Benefits: Specialized equipment and/or services available at the receiving facility (Include comment)________________________ (Urology with OR )    Risks: Potential for delay in receiving treatment, Potential deterioration of medical condition, Loss of IV, Increased discomfort during transfer, Possible worsening of condition or death during transfer      Consent for Transfer:  I acknowledge that my medical condition has been evaluated and explained to me by the emergency department physician or other qualified medical person and/or my attending physician, who has recommended that I be transferred to the service of  Accepting Physician: Philip Moulton at [address] Name, [address] : [facility]  The above potential benefits of such transfer, the potential risks associated with such transfer, and the probable risks of not being transferred have been explained to me, and I fully understand them  The doctor has explained that, in my case, the benefits of transfer outweigh the risks  I agree to be transferred  I authorize the performance of emergency medical procedures and treatments upon me in both transit and upon arrival at the receiving facility  Additionally, I authorize the release of any and all medical records to the receiving facility and request they be transported with me, if possible  I understand that the safest mode of transportation during a medical emergency is an ambulance and that the Hospital advocates the use of this mode of transport   Risks of traveling to the receiving facility by car, including absence of medical control, life sustaining equipment, such as oxygen, and medical personnel has been explained to me and I fully understand them  (KATI CORRECT BOX BELOW)  [  ]  I consent to the stated transfer and to be transported by ambulance/helicopter  [  ]  I consent to the stated transfer, but refuse transportation by ambulance and accept full responsibility for my transportation by car  I understand the risks of non-ambulance transfers and I exonerate the Hospital and its staff from any deterioration in my condition that results from this refusal     X___________________________________________    DATE  21  TIME________  Signature of patient or legally responsible individual signing on patient behalf           RELATIONSHIP TO PATIENT_________________________          Provider Certification    NAME Molina Ramirez   1945                              MRN [id number]    A medical screening exam was performed on the above named patient  Based on the examination:    Condition Necessitating Transfer The primary encounter diagnosis was Subdural hematoma ([address] )  A diagnosis of SAH (subarachnoid hemorrhage) ([facility] ) was also pertinent to this visit      Patient Condition: The patient has been stabilized such that within reasonable medical probability, no material deterioration of the patient condition or the condition of the unborn child(domitila) is likely to result from the transfer    Reason for Transfer: Level of Care needed not available at this facility    Transfer Requirements: Facility [facility]   · Space available and qualified personnel available for treatment as acknowledged by    · Agreed to accept transfer and to provide appropriate medical treatment as acknowledged by       Tennis Gallery  · Appropriate medical records of the examination and treatment of the patient are provided at the time of transfer   [address] _______  · Transfer will be performed by qualified personnel from    and appropriate transfer equipment as required, including the use of necessary and appropriate life support measures  Provider Certification: I have examined the patient and explained the following risks and benefits of being transferred/refusing transfer to the patient/family:  General risk, such as traffic hazards, adverse weather conditions, rough terrain or turbulence, possible failure of equipment (including vehicle or aircraft), or consequences of actions of persons outside the control of the transport personnel      Based on these reasonable risks and benefits to the patient and/or the unborn child(domitila), and based upon the information available at the time of the patients examination, I certify that the medical benefits reasonably to be expected from the provision of appropriate medical treatments at another medical facility outweigh the increasing risks, if any, to the individuals medical condition, and in the case of labor to the unborn child, from effecting the transfer      X____________________________________________ DATE 09/19/21        TIME_______      ORIGINAL - SEND TO MEDICAL RECORDS   COPY - SEND WITH PATIENT DURING TRANSFER

## 2021-09-19 NOTE — PHYSICAL THERAPY NOTE
Patient's Name: Lena Jurado  Admitting Diagnosis  Multiple injuries [T07  XXXA]  SAH (subarachnoid hemorrhage) ([facility] ) [I60 9]    Problem List  Patient Active Problem List   Diagnosis    Fall    SDH (subdural hematoma) ([facility] )    SAH (subarachnoid hemorrhage) ([place] [address] )    Abrasions of multiple sites    Multiple skin tears    Facial contusion, initial encounter       Past Medical History  Past Medical History:   Diagnosis Date    Diabetes mellitus ([place] [address] )     Disease of thyroid gland     Hypertension         09/19/21 1352   PT Last Visit   PT Visit Date 09/19/21   Note Type   Note type Evaluation   Pain Assessment   Pain Assessment Tool Pain Assessment not indicated - pt denies pain   Pain Score No Pain   Home Living   Type of Home House   Home Layout Multi-level;Performs ADLs on one level  (stairs to 2nd floor w/ rail)   Bathroom Shower/Tub Walk-in shower   Bathroom Toilet Standard   Bathroom Equipment Grab bars in shower;Built-in shower seat; Toilet raiser;Grab bars around toilet   Additional Comments Pt lives in a house w/ his wife whom he is the PCG; pt reports pt is able to transfer and ambulate independently but occasionally needs assist w/ showering  Pt's house has 0 [address] w/ bedroom + full bathroom on 1st floor  Pt has 13 stairs to the kitchen w/ R rail  Prior Function   Level of [place] Independent with ADLs and functional mobility   Lives With Spouse   Receives Help From Family   ADL Assistance Independent   IADLs Independent   Falls in the last 6 months 1 to 4  (Pt reports another fall in August w/ L rib fx )   Vocational Retired   Comments Pt was independent w/ functional mobility w/o AD  Restrictions/Precautions   Weight Bearing Precautions Per Order No   Other Precautions Chair Alarm; Bed Alarm;Multiple lines; Fall Risk;Pain   General   Family/Caregiver Present No   Cognition   Arousal/Participation Responsive   Orientation Level Oriented X4   RLE Assessment   RLE Assessment   (grossly 4+/5)   LLE Assessment   LLE Assessment   (grossly 4+/5)   Coordination   Movements are Fluid and Coordinated 1   Bed Mobility   Supine to Sit 5  Supervision   Additional items Bedrails; Increased time required   Sit to Supine Unable to assess   Additional Comments Pt rec'd supine  Pt left in chair w/ chair alarm activated, all needs in reach at end of session  Transfers   Sit to Stand 5  Supervision   Additional items Increased time required;Verbal cues   Stand to Sit 5  Supervision   Additional Comments no AD   Ambulation/Elevation   Gait pattern   (occasional scissoring and lateral movement)   Gait Assistance 5  Supervision  (cga)   Additional items Assist x 1;Verbal cues   Assistive Device None   Distance 85'   Stair Management Assistance 4  Minimal assist  (cga)   Additional items Assist x 1;Verbal cues   Stair Management Technique One rail R;Alternating pattern; Step to pattern  (alternating ascending, vc to do step-to descending)   Number of Stairs 10   Balance   Static Sitting Good   Dynamic Sitting Fair +   Static Standing Fair   Dynamic Standing Fair -   Ambulatory Fair -   Endurance Deficit   Endurance Deficit Yes   Endurance Deficit Description fatigue, decreased endurance   Activity Tolerance   Activity Tolerance Patient limited by fatigue   Medical Staff Made Aware yes, nsg   Nurse Made Aware Pamela MARSHALL   Assessment   Prognosis Excellent   Problem List Decreased strength; Impaired balance;Decreased endurance;Decreased mobility; Decreased safety awareness   Assessment Pt is 78 y o  male seen for PT evaluation s/p admit to [place] [address] on 9/19/2021  Pt presenting s/p fall w/ head strike; imaging revealed unchanged small left parafalcine acute SDH + resolved trace SAH  Comorbidities affecting pt's physical performance at time of assessment listed above and significant for: diabetes, thyroid disease, HTN, + fall in August 2021 w/ L rib fx   Personal factors affecting pt at time of IE include: stairs in house, pt is primary 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, limited home support, advanced age, past experience, limited insight into impairments and recent fall(s)  Due to ongoing medical workup for primary dx, fall risk, increased reliance on more restrictive AD compared to baseline, increased assistance needed from caregiver at current time, evolving clinical picture (moderate complexity)   Prior to admission, pt was I w/ functional mobility w/o AD  Currently pt  is requiring S for bed skills; S for functional transfers and CGA for ambulation; recommend RW due to unsteadiness during eval w/o AD  Pt presents functioning below baseline and currently w/ overall mobility deficits 2* to: decreased endurance; decreased activity tolerance; decreased coordination; impaired balance; gait deviations; decreased safety awareness  Pt currently at risk for falls  ([address] 24[address]28 ) Pt will continue to benefit from skilled PT interventions to address stated impairments; to maximize functional potential; for ongoing pt/ family training; and DME needs  PT is currently recommending home w/ home PT + RW  Pt agreeable to plan and goals as stated on evaluation  Barriers to Discharge Inaccessible home environment;Decreased caregiver support   Goals   Patient Goals "to go home"   STG Expiration Date 09/29/21   Short Term Goal #1 In 10 days pt will complete: 1) Bed mobility skills with I to facilitate safe return to previous living environment and decrease burden on caregivers  2) Functional transfers with I  to facilitate safe return to previous living environment  3) Ambulation with least restrictive ' w/ Margaret without LOB and stable vitals for safe ambulation in home/ community environment  4) Stair training up/ down 13 step/s with 1 rail/s  and Margaret for safe access to previous living environment and to increase community access  5) Improve balance by 1 grade in order to decrease fall risk    6) Improve LE strength grades by 1 to increase independence w/ all functional mobility, transfers and gait  7) PT for ongoing pt and family education; DME needs and D/C planning to promote highest level of function in least restrictive environment  PT Treatment Day 0   Plan   Treatment/Interventions Functional transfer training;LE strengthening/ROM; Therapeutic exercise; Endurance training;Patient/family training;Bed mobility;Gait training;Equipment eval/education; Compensatory technique education;Spoke to nursing   PT Frequency   (3-5x/wk)   Recommendation   PT Discharge Recommendation Home with home health rehabilitation   Jalen vincent   Change/add to [employer]? No   PT - OK to Discharge Yes   AM-PAC Basic Mobility Inpatient   Turning in Bed Without Bedrails 4   Lying on Back to Sitting on Edge of Flat Bed 4   Moving Bed to Chair 4   Standing Up From Chair 4   Walk in Room 3   Climb 3-5 Stairs 3   Basic Mobility Inpatient Raw Score 22   Basic Mobility Standardized Score 47 4     Past Surgical History  History reviewed  No pertinent surgical history      Nichol Notice, PT, DPT

## 2021-09-19 NOTE — CONSULTS
[address] [facility]  1945, 78 y o  male MRN: [id number]  Unit/Bed#: [facility] 822-01 Encounter: [id number]  Primary Care Provider: Kevin Saldivar DO   Date and time admitted to hospital: 9/19/2021  5:18 AM    Inpatient consult to Neurosurgery  Consult performed by: Catalina Lundberg PA-C  Consult ordered by: Alessandra Medina MD          * SDH (subdural hematoma) [facility])  Assessment & Plan  Pt presented s/p fall with small left parafalcine acute SDH and trace SAH in the left paramedial frontal region  · Imaging reviewed personally and by attending  Final results as below  · CT head wo 09/19/2021: Unchanged small left parafalcine acute subdural hematoma  Resolved trace subarachnoid hemorrhage in left paramedian frontal region  Unchanged mild ventriculomegaly out of proportion to the degree of cerebral atrophy  Slightly increased size of small subgaleal hematoma in right frontal region        Plan  · Continue frequent neurological checks  · STAT CT head with any neurological decline or a decrease in GCS of 2pts within 1 hr   · Recommend SBP <160 mmHg  · Seizure prophylaxis per primary team  · Hold/ avoid all antiplatelet and anticoagulation medications  Patient was taking aspirin for heart health  Patient continue to hold aspirin for at least 2 weeks  · Pain control per primary team  · Mobilize and eval by PT/OT when able to  · DVT PPX: SCDs only at this time  Given slight increase in the small right frontal subgaleal collection, continue to hold pharm dvt ppx for another 48 hrs  May repeat [address] for stability prior to starting pharm dvt ppx if needed  · No neurosurgical intervention is anticipated at this time  · Neurosurgery will see patient p r n  During the remainder of this hospitalization  Patient will have follow-up in 2 weeks repeat CT head  Please call with any questions/concerns         SAH (subarachnoid hemorrhage) ([facility])  Assessment & Plan  See plan above  Patient personally accessed and examined 09/19/2021 at 2:30 p m  History of Present Illness   History, ROS and PFSH obtained from patient and chart  HPI: Joao Somers  is a 78 y o  male who is status post fall who was found to have small subdural and subarachnoid hemorrhage  Today patient admits to having mild headache  Patient denies any new numbness, tingling, or weakness  Patient denies dizziness or lightheadedness  Patient denies any changes in vision  Review of Systems   Constitutional: Negative for activity change, chills and fever  HENT: Negative for hearing loss  Eyes: Negative for photophobia and pain  Respiratory: Negative for chest tightness and shortness of breath  Cardiovascular: Negative for chest pain and palpitations  Gastrointestinal: Negative for abdominal pain, nausea and vomiting  Genitourinary: Negative for difficulty urinating and dysuria  Musculoskeletal: Negative for neck pain and neck stiffness  Skin:        Forehead, elbow and knee abrasions  Neurological: Negative for dizziness, seizures, speech difficulty, weakness, light-headedness and headaches  Psychiatric/Behavioral: Negative for confusion and decreased concentration  Historical Information   Past Medical History:   Diagnosis Date    Diabetes mellitus ([facility] )     Disease of thyroid gland     Hypertension      History reviewed  No pertinent surgical history  Social History     Substance and Sexual Activity   Alcohol Use Not Currently    Comment: Social     Social History     Substance and Sexual Activity   Drug Use Never     Social History     Tobacco Use   Smoking Status Never Smoker   Smokeless Tobacco Never Used     History reviewed  No pertinent family history  Meds/Allergies   all current active meds have been reviewed  No Known Allergies    Objective   I/O       09/17 0701 - 09/18 0700 09/18 0701 - 09/19 0700 09/19 0701 - 09/20 0700    P  O    180    Total Intake   180    Net   +180           Unmeasured Urine Occurrence   1 x    Unmeasured Stool Occurrence   1 x          Physical Exam  Constitutional:       Appearance: He is well-developed  HENT:      Head: Normocephalic and atraumatic  Eyes:      General: No scleral icterus  Conjunctiva/sclera: Conjunctivae normal       Pupils: Pupils are equal, round, and reactive to light  Neck:      Trachea: No tracheal deviation  Cardiovascular:      Rate and Rhythm: Normal rate  Pulmonary:      Effort: Pulmonary effort is normal    Abdominal:      Palpations: Abdomen is soft  Tenderness: There is no abdominal tenderness  There is no guarding  Musculoskeletal:      Cervical back: Neck supple  Skin:     General: Skin is warm and dry  Coloration: Skin is not pale  Findings: No rash  Comments: Forehead, left elbow and left knee abrasions  Neurological:      Mental Status: He is alert and oriented to person, place, and time  Comments: GCS 15, Awake, Alert, Oriented    Motor: GARCIA, strength 4+/5 throughout    Sensation:  intact to LT X 4     Reflexes: 2+ and symmetric, no raphael's or clonus     Coordination: no drift bilateral upper extremities     Psychiatric:         Behavior: Behavior normal        Neurologic Exam     Mental Status   Oriented to person, place, and time  Cranial Nerves     CN III, IV, VI   Pupils are equal, round, and reactive to light  Vitals:Blood pressure 120/68, pulse 71, temperature 97 6 °F (36 4 °C), resp  rate 20, SpO2 96 %  ,There is no height or weight on file to calculate BMI       Lab Results:   Results from last 7 days   Lab Units 09/19/21  0102   WBC Thousand/uL 8 20   HEMOGLOBIN g/dL 9 0*   HEMATOCRIT % 28 5*   PLATELETS Thousands/uL 234   NEUTROS PCT % 77*   MONOS PCT % 13*     Results from last 7 days   Lab Units 09/19/21  0102   POTASSIUM mmol/L 4 6   CHLORIDE mmol/L 98   CO2 mmol/L 28   BUN mg/dL 40*   CREATININE mg/dL 1 75*   CALCIUM mg/dL 9 3 Results from last 7 days   Lab Units 09/19/21  0102   INR  1 15   PTT seconds 36     Imaging Studies: I have personally reviewed pertinent reports  EKG, Pathology, and Other Studies: I have personally reviewed pertinent reports  VTE Prophylaxis: Sequential compression device Humberto Hopkins)     Code Status: Level 1 - Full Code  Advance Directive and Living Will:      Power of :    POLST:      Counseling / Coordination of Care  I spent 45 minutes with the patient  PLEASE NOTE:  This encounter may have been completed utilizing the [employer] Direct Speech Voice Recognition Software  Grammatical errors, random word insertions, pronoun errors and incomplete sentences are occasional consequences of the system due to software limitations, ambient noise and hardware issues  These may be missed even after proof reading prior to affixing electronic signature  Please do not hesitate to contact me directly if you have any questions or concerns about the content, text or information contained within the body of this dictation

## 2021-09-19 NOTE — PLAN OF CARE
Problem: PHYSICAL THERAPY ADULT  Goal: Performs mobility at highest level of function for planned discharge setting  See evaluation for individualized goals  Description: Treatment/Interventions: Functional transfer training, LE strengthening/ROM, Therapeutic exercise, Endurance training, Patient/family training, Bed mobility, Gait training, Equipment eval/education, Compensatory technique education, Spoke to nursing  Equipment Recommended: Lena Tellez       See flowsheet documentation for full assessment, interventions and recommendations  Note: Prognosis: Excellent  Problem List: Decreased strength, Impaired balance, Decreased endurance, Decreased mobility, Decreased safety awareness  Assessment: Pt is 78 y o  male seen for PT evaluation s/p admit to [facility] on 9/19/2021  Pt presenting s/p fall w/ head strike; imaging revealed unchanged small left parafalcine acute SDH + resolved trace SAH  Comorbidities affecting pt's physical performance at time of assessment listed above and significant for: diabetes, thyroid disease, HTN, + fall in August 2021 w/ L rib fx  Personal factors affecting pt at time of IE include: stairs in house, pt is primary 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, limited home support, advanced age, past experience, limited insight into impairments and recent fall(s)  Due to ongoing medical workup for primary dx, fall risk, increased reliance on more restrictive AD compared to baseline, increased assistance needed from caregiver at current time, evolving clinical picture (moderate complexity)   Prior to admission, pt was I w/ functional mobility w/o AD  Currently pt  is requiring S for bed skills; S for functional transfers and CGA for ambulation; recommend RW due to unsteadiness during eval w/o AD     Pt presents functioning below baseline and currently w/ overall mobility deficits 2* to: decreased endurance; decreased activity tolerance; decreased coordination; impaired balance; gait deviations; decreased safety awareness  Pt currently at risk for falls  ([address] 24/28 ) Pt will continue to benefit from skilled PT interventions to address stated impairments; to maximize functional potential; for ongoing pt/ family training; and DME needs  PT is currently recommending home w/ home PT + RW  Pt agreeable to plan and goals as stated on evaluation  Barriers to Discharge: Inaccessible home environment, Decreased caregiver support        PT Discharge Recommendation: Home with home health rehabilitation     PT - OK to Discharge: Yes    See flowsheet documentation for full assessment

## 2021-09-19 NOTE — ASSESSMENT & PLAN NOTE
- Traumatic brain injury with trace acute subarachnoid hemorrhage in the left paramedian frontal region, present on admission   - Neurosurgery evaluation and recommendations appreciated  No further workup or intervention necessary   - Hold anti-platelet and anticoagulant medications for least 2 weeks and/or until cleared by Neurosurgery  - Continue Keppra for 7 days for seizure prophylaxis  - Monitor neurologic exam   - Continue symptomatic management with analgesia as needed  - Repeat CT scan from 09/19/2021 reviewed  Trace subarachnoid hemorrhage resolved  - PT and OT evaluation and treatment as indicated  Recommended home health therapy services and a roller walker   - Outpatient Neurosurgery follow-up in 2 weeks with repeat CT scan of the head prior to follow-up appointment

## 2021-09-19 NOTE — DISCHARGE INSTRUCTIONS
Neurosurgery Discharge Instructions following traumatic head bleed:      Do not take any blood thinning medications (ie  No Advil  No motrin  No ibuprofen  No Aleve  No Aspirin  No fishoil  No heparin  No antiplatelet / no anticoagulation medication)   Refrain from activity that increases chance of trauma to head or falls  Recommend you take fall precautions   No strenuous activity or sports   Return to hospital Emergency Room if you experience worsening / new headache, nausea/vomiting, speech/vision change, seizure, confusion / mental status change, weakness, or other neurological changes  Follow-up as scheduled with a repeat CT head without contrast to be completed 2-3 days prior to visit  Prescription has been entered electronically  Please call [phone] to schedule

## 2021-09-19 NOTE — PROGRESS NOTES
[facility]  Progress Note - Marco A Longoria 1945, 78 y o  male MRN: [id number]  [facility] Encounter: [id number]  Primary Care Provider: Tucker Mauro DO   Date and time admitted to hospital: 9/19/2021  5:18 AM    Fall  Assessment & Plan  - Status post fall with the below noted injuries  - Fall precautions  - Geriatric Medicine consultation for evaluation, medication review and recommendations   - PT and OT evaluation and treatment as indicated  - Case Management consultation for disposition planning  * SDH (subdural hematoma) (HCC)  Assessment & Plan  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, present on admission   - Neurosurgery evaluation and recommendations appreciated  No further workup or intervention necessary   - Hold anti-platelet and anticoagulant medications for least 2 weeks and/or until cleared by Neurosurgery  - Continue Keppra for 7 days for seizure prophylaxis  - Monitor neurologic exam   - Continue symptomatic management with analgesia as needed  - Repeat CT scan from 09/19/2021 reviewed  - PT and OT evaluation and treatment as indicated  Recommended home health therapy services and a roller walker   - Outpatient Neurosurgery follow-up in 2 weeks with repeat CT scan of the head prior to follow-up appointment  SAH (subarachnoid hemorrhage) (HCC)  Assessment & Plan  - Traumatic brain injury with trace acute subarachnoid hemorrhage in the left paramedian frontal region, present on admission   - Neurosurgery evaluation and recommendations appreciated  No further workup or intervention necessary   - Hold anti-platelet and anticoagulant medications for least 2 weeks and/or until cleared by Neurosurgery  - Continue Keppra for 7 days for seizure prophylaxis  - Monitor neurologic exam   - Continue symptomatic management with analgesia as needed  - Repeat CT scan from 09/19/2021 reviewed  Trace subarachnoid hemorrhage resolved  - PT and OT evaluation and treatment as indicated  Recommended home health therapy services and a roller walker   - Outpatient Neurosurgery follow-up in 2 weeks with repeat CT scan of the head prior to follow-up appointment  Facial contusion, initial encounter  Assessment & Plan  - Facial contusion and forehead abrasion, present on admission   - Local wound care as indicated  - Analgesia as needed  - Ice for pain and swelling  Abrasions of multiple sites  Assessment & Plan  - Abrasions of multiple sites including on the face, bilateral upper extremities, and bilateral lower extremities  - Local wound care as indicated  - Analgesia as needed  - Ice for pain and swelling  Multiple skin tears  Assessment & Plan  - Multiple skin tears following fall   - Local wound care as indicated  - Analgesia as needed  - Ice for pain and swelling  TERTIARY TRAUMA SURVEY NOTE    Prophylaxis: Sequential compression device (Venodyne)     Disposition:  Anticipate likely discharge home this afternoon  Code status:  Level 1 - Full Code    Consultants:      1  Neurosurgery  2  Geriatric Medicine  Is the patient 72 years or older?: YES:    1  Before the illness or injury that brought you to the Emergency, did you need someone to help you on a regular basis? 0=No   2  Since the illness or injury that brought you to the Emergency, have you needed more help than usual to take care of yourself? 1=Yes   3  Have you been hospitalized for one or more nights during the past 6 months (excluding a stay in the Emergency Department)? 1=Yes   4  In general, do you see well? 0=Yes   5  In general, do you have serious problems with your memory? 0=No   6  Do you take more than three different medications everyday?  1=Yes   TOTAL   3     Did you order a geriatric consult if the score was 2 or greater?: yes          SUBJECTIVE:     Transfer from: [address] [place] [address] Received: yes  Tertiary Exam Due on: 9/19/2021    Mechanism of Injury:Fall    Details related to Injury: +LOC:  no    Chief Complaint:  I feel fine      HPI/Last 24 hour events:  Patient is overall feeling pretty well  He denies having any pain at this time  He denies any headaches, visual changes, lightheadedness or dizziness, nausea or vomiting  He tolerated his diet today  He states he did pretty well with therapy  Active medications:           Current Facility-Administered Medications:     acetaminophen (TYLENOL) tablet 650 mg, 650 mg, Oral, Q4H PRN    levETIRAcetam (KEPPRA) tablet 500 mg, 500 mg, Oral, BID, 500 mg at 09/19/21 0839    ondansetron (ZOFRAN) injection 4 mg, 4 mg, Intravenous, Q6H PRN      OBJECTIVE:     Vitals:   Vitals:    09/19/21 1553   BP: 120/68   Pulse: 71   Resp: 20   Temp: 97 6 °F (36 4 °C)   SpO2: 96%       Physical Exam:   GENERAL APPEARANCE: Patient in no acute distress  HEENT: NC, facial contusion with ecchymosis, mild swelling and tenderness and forehead abrasion; PERRL, EOMs intact, vision grossly intact; Mucous membranes moist  NECK / BACK:  No midline cervical, thoracic or lumbar spine tenderness, step-offs or deformities  No paraspinal muscular tenderness in the neck or back  CV: Regular rate and rhythm; no murmur/gallops/rubs appreciated  CHEST / LUNGS: Clear to auscultation; no wheezes/rales/rhonci  No chest wall tenderness, crepitus or deformities  ABD: NABS; soft; non-distended; non-tender  :  Voiding spontaneously  EXT: +2 pulses bilaterally upper & lower extremities  Chronic venous stasis changes to the bilateral lower extremities  Normal range of motion in all 4 extremities without pain, tenderness or deformity  NEURO: GCS 15; no focal neurologic deficits; neurovascularly intact  SKIN: Warm, dry and well perfused; no rash; no jaundice  Multiple skin tears/abrasions on all 4 extremities with minimal tenderness      I/O:   I/O       09/17 2115 - 09/18 0700 09/18 0701 - 09/19 0700 09/19 0701 - 09/20 0700    P  O    180    Total Intake   180    Net   +180           Unmeasured Urine Occurrence   1 x    Unmeasured Stool Occurrence   1 x          Invasive Devices: Invasive Devices     Peripheral Intravenous Line            Peripheral IV 09/19/21 Left Antecubital <1 day                  Imaging:   XR Trauma chest portable    Result Date: 9/19/2021  Impression: No acute cardiopulmonary disease  Workstation performed: [id number]     CT head wo contrast    Result Date: 9/19/2021  Impression: Unchanged small left parafalcine acute subdural hematoma  Resolved trace subarachnoid hemorrhage in left paramedian frontal region  Unchanged mild ventriculomegaly out of proportion to the degree of cerebral atrophy Slightly increased size of small subgaleal hematoma in right frontal region  Workstation performed: [id number]     TRAUMA - CT head wo contrast    Result Date: 9/19/2021  Impression: There is acute subdural hemorrhage along the left anterior falx, measuring up to 4 mm thickness  There is also a small amount of posttraumatic subarachnoid hemorrhage  Follow-up as clinically indicated  I personally discussed this study with Julieth King on 9/19/2021 at 1:05 AM  Workstation performed: [id number]     TRAUMA - CT spine cervical wo contrast    Result Date: 9/19/2021  Impression: No acute cervical spine fracture or traumatic malalignment  Moderate multilevel degenerative changes are present    Workstation performed: [id number]       Labs:   CBC:   Lab Results   Component Value Date    WBC 8 20 09/19/2021    HGB 9 0 (L) 09/19/2021    HCT 28 5 (L) 09/19/2021    MCV 82 09/19/2021     09/19/2021    MCH 25 8 (L) 09/19/2021    MCHC 31 5 09/19/2021    RDW 18 3 (H) 09/19/2021    MPV 7 3 (L) 09/19/2021     CMP:   Lab Results   Component Value Date    CL 98 09/19/2021    CO2 28 09/19/2021    BUN 40 (H) 09/19/2021    CREATININE 1 75 (H) 09/19/2021    CALCIUM 9 3 09/19/2021 EGFR 38 09/19/2021       Martha Jones PA-C  9/19/2021 02:56 PM

## 2021-09-19 NOTE — ASSESSMENT & PLAN NOTE
- Facial contusion and forehead abrasion, present on admission   - Local wound care as indicated  - Analgesia as needed  - Ice for pain and swelling

## 2021-09-19 NOTE — PLAN OF CARE
Problem: Potential for Falls  Goal: Patient will remain free of falls  Description: INTERVENTIONS:  - Educate patient/family on patient safety including physical limitations  - Instruct patient to call for assistance with activity   - Consult OT/PT to assist with strengthening/mobility   - Keep Call bell within reach  - Keep bed low and locked with side rails adjusted as appropriate  - Keep care items and personal belongings within reach  - Initiate and maintain comfort rounds  - Make Fall Risk Sign visible to staff  - Offer Toileting every 2   Hours, in advance of need  - Initiate/Maintain alarm  - Obtain necessary fall risk management equipment:   - Apply yellow socks and bracelet for high fall risk patients  - Consider moving patient to room near nurses station  Outcome: Progressing     Problem: MOBILITY - ADULT  Goal: Maintain or return to baseline ADL function  Description: INTERVENTIONS:  -  Assess patient's ability to carry out ADLs; assess patient's baseline for ADL function and identify physical deficits which impact ability to perform ADLs (bathing, care of mouth/teeth, toileting, grooming, dressing, etc )  - Assess/evaluate cause of self-care deficits   - Assess range of motion  - Assess patient's mobility; develop plan if impaired  - Assess patient's need for assistive devices and provide as appropriate  - Encourage maximum independence but intervene and supervise when necessary  - Involve family in performance of ADLs  - Assess for home care needs following discharge   - Consider OT consult to assist with ADL evaluation and planning for discharge  - Provide patient education as appropriate  Outcome: Progressing  Goal: Maintains/Returns to pre admission functional level  Description: INTERVENTIONS:  - Perform BMAT or MOVE assessment daily    - Set and communicate daily mobility goal to care team and patient/family/caregiver     - Collaborate with rehabilitation services on mobility goals if consulted  - Perform Range of Motion 3 times a day  - Reposition patient every 2 hours    - Dangle patient 3 times a day  - Stand patient 3 times a day  - Ambulate patient 3 times a day  - Out of bed to chair 3 times a day   - Out of bed for meals 3 times a day  - Out of bed for toileting  - Record patient progress and toleration of activity level   Outcome: Progressing     Problem: PAIN - ADULT  Goal: Verbalizes/displays adequate comfort level or baseline comfort level  Description: Interventions:  - Encourage patient to monitor pain and request assistance  - Assess pain using appropriate pain scale  - Administer analgesics based on type and severity of pain and evaluate response  - Implement non-pharmacological measures as appropriate and evaluate response  - Consider cultural and social influences on pain and pain management  - Notify physician/advanced practitioner if interventions unsuccessful or patient reports new pain  Outcome: Progressing     Problem: INFECTION - ADULT  Goal: Absence or prevention of progression during hospitalization  Description: INTERVENTIONS:  - Assess and monitor for signs and symptoms of infection  - Monitor lab/diagnostic results  - Monitor all insertion sites, i e  indwelling lines, tubes, and drains  - Monitor endotracheal if appropriate and nasal secretions for changes in amount and color  - [place] appropriate cooling/warming therapies per order  - Administer medications as ordered  - Instruct and encourage patient and family to use good hand hygiene technique  - Identify and instruct in appropriate isolation precautions for identified infection/condition  Outcome: Progressing  Goal: Absence of fever/infection during neutropenic period  Description: INTERVENTIONS:  - Monitor WBC    Outcome: Progressing     Problem: SAFETY ADULT  Goal: Patient will remain free of falls  Description: INTERVENTIONS:  - Educate patient/family on patient safety including physical limitations  - Instruct patient to call for assistance with activity   - Consult OT/PT to assist with strengthening/mobility   - Keep Call bell within reach  - Keep bed low and locked with side rails adjusted as appropriate  - Keep care items and personal belongings within reach  - Initiate and maintain comfort rounds  - Make Fall Risk Sign visible to staff  - Offer Toileting every 2 Hours, in advance of need  - Initiate/Maintain alarm  - Obtain necessary fall risk management equipment:   - Apply yellow socks and bracelet for high fall risk patients  - Consider moving patient to room near nurses station  Outcome: Progressing  Goal: Maintain or return to baseline ADL function  Description: INTERVENTIONS:  -  Assess patient's ability to carry out ADLs; assess patient's baseline for ADL function and identify physical deficits which impact ability to perform ADLs (bathing, care of mouth/teeth, toileting, grooming, dressing, etc )  - Assess/evaluate cause of self-care deficits   - Assess range of motion  - Assess patient's mobility; develop plan if impaired  - Assess patient's need for assistive devices and provide as appropriate  - Encourage maximum independence but intervene and supervise when necessary  - Involve family in performance of ADLs  - Assess for home care needs following discharge   - Consider OT consult to assist with ADL evaluation and planning for discharge  - Provide patient education as appropriate  Outcome: Progressing  Goal: Maintains/Returns to pre admission functional level  Description: INTERVENTIONS:  - Perform BMAT or MOVE assessment daily    - Set and communicate daily mobility goal to care team and patient/family/caregiver  - Collaborate with rehabilitation services on mobility goals if consulted  - Perform Range of Motion 3 times a day  - Reposition patient every 2 hours    - Dangle patient 3 times a day  - Stand patient 3 times a day  - Ambulate patient 3 times a day  - Out of bed to chair 3 times a day   - Out of bed for meals 3 times a day  - Out of bed for toileting  - Record patient progress and toleration of activity level   Outcome: Progressing     Problem: DISCHARGE PLANNING  Goal: Discharge to home or other facility with appropriate resources  Description: INTERVENTIONS:  - Identify barriers to discharge w/patient and caregiver  - Arrange for needed discharge resources and transportation as appropriate  - Identify discharge learning needs (meds, wound care, etc )  - Arrange for interpretive services to assist at discharge as needed  - Refer to Case Management Department for coordinating discharge planning if the patient needs post-hospital services based on physician/advanced practitioner order or complex needs related to functional status, cognitive ability, or social support system  Outcome: Progressing     Problem: Knowledge Deficit  Goal: Patient/family/caregiver demonstrates understanding of disease process, treatment plan, medications, and discharge instructions  Description: Complete learning assessment and assess knowledge base    Interventions:  - Provide teaching at level of understanding  - Provide teaching via preferred learning methods  Outcome: Progressing

## 2021-09-19 NOTE — H&P
H&P Exam - Trauma   Kirsten Merlin  78 y o  male MRN: [id number]  Unit/Bed#: ED 08 Encounter: [id number]    Assessment/Plan   Trauma Alert: Evaluation  Model of Arrival: Ambulance  Trauma Team: Attending Nathalia Negron, Residents Chris and Fellow Hollis Bean  Consultants: Neurosurgery: AM Evaluation  Returned call: No AM evaluation    Trauma Active Problems:   SAH/SDH    Trauma Plan:   HOT  q1h neurochecks  Keppra  Appreciate NSG consult  Repeat CT head  Received DDAVP already  No DVT prophylaxis yet    Chief Complaint: HA    History of Present Illness   HPI:  Kirsten Merlin  is a 78 y o  male PMH VAZQUEZ cirrhosis on ASA who presents with mechanical fall on ASA  He was evaluated at [facility] and found to have SAH/SDH and so transferred here for further management  Received DDAVP PTA  Mechanism:Fall    Review of Systems   Constitutional: Negative for chills, diaphoresis and fever  HENT: Negative  Eyes: Negative  Negative for visual disturbance  Respiratory: Negative  Negative for shortness of breath  Cardiovascular: Negative  Negative for chest pain  Gastrointestinal: Negative for abdominal pain, diarrhea, nausea and vomiting  Endocrine: Negative  Genitourinary: Negative  Negative for dysuria  Musculoskeletal: Negative  Negative for myalgias  Skin: Positive for color change  Negative for rash  Allergic/Immunologic: Negative  Neurological: Positive for headaches  Negative for weakness, light-headedness and numbness  Hematological: Negative  Psychiatric/Behavioral: Negative  All other systems reviewed and are negative  12-point, complete review of systems was reviewed and negative except as stated above  Historical Information   History is unobtainable from the patient due to none    Efforts to obtain history included the following sources: obtained from other records  Allergies    No known active allergies  Medications  Reconcile with Patient's Chart  Medication Sig Dispensed Refills Start Date End Date Status   aspirin 81 MG EC tablet    Indications: states needs to stop 1 week before surgery Take 81 mg by mouth nightly Indications: states needs to stop 1 week before surgery     0 03/05/2013   Active   ascorbic acid (vitamin C) 1000 MG tablet    Indications: states stopped taking Take 1,000 mg by mouth daily Indications: states stopped taking     0 04/17/2013   Active   cholecalciferol, vitamin D3, (VITAMIN D3) 1,000 unit capsule   Take 1,000 Units by mouth nightly  1 daily    0 04/17/2013   Active   miscellaneous medical supply kit   by miscellaneous route  CPAP AND SUPPLIES , pressure 4    0     Active   ferrous sulfate (IRON ORAL)   Take 325 mg by mouth every morning     0     Active   betamethasone valerate (VALISONE) 0 1 % cream    Indications: Dermatitis Apply topically 2 (two) times a day  45 g   3 12/08/2020 12/08/2021 Active   furosemide (LASIX) 40 MG tablet   Take 1 tablet (40 mg total) by mouth daily  180 tablet   3 01/19/2021 01/19/2022 Active   metoprolol (TOPROL-XL) 100 MG 24 hr tablet    Indications: Essential hypertension Take 1 tablet (100 mg total) by mouth daily  90 tablet   1 03/15/2021   Active   atorvastatin (LIPITOR) 20 MG tablet    Indications: Mixed hyperlipidemia Take 1 tablet (20 mg total) by mouth nightly  90 tablet   1 03/19/2021   Active   rifAXIMin (XIFAXAN) 550 mg tablet   Take 550 mg by mouth 2 (two) times a day     0     Active   escitalopram oxalate (LEXAPRO) 10 MG tablet   Take 10 mg by mouth daily    0     Active   pantoprazole (PROTONIX) 40 MG tablet   Take 1 tablet (40 mg total) by mouth 2 (two) times a day  60 tablet   0 04/08/2021 04/08/2022 Active   metFORMIN (GLUCOPHAGE) 500 MG tablet    Indications: Type 2 diabetes mellitus without complication, without long-term current use of insulin (HCC) Take 1 tablet (500 mg total) by mouth 2 (two) times a day with meals   180 tablet   1 04/30/2021 04/30/2022 Active   levothyroxine (EUTHYROX) 112 MCG tablet    Indications: Acquired hypothyroidism Take 1 tablet (112 mcg total) by mouth daily  90 tablet   1 06/25/2021   Active   spironolactone (ALDACTONE) 100 MG tablet   Take 1 tablet (100 mg total) by mouth every morning  60 tablet   1 08/19/2021   Active   docusate sodium (COLACE) 100 MG capsule   Take 1 capsule (100 mg total) by mouth 2 (two) times a day  61 capsule   0 08/19/2021   Active   Active Problems  Reconcile with Patient's Chart  Problem Noted Date   Multiple rib fractures 08/17/2021   Pleural effusion 08/16/2021   Decompensated hepatic cirrhosis 08/16/2021   Lower GI bleeding 04/07/2021   Anemia, symptomatic 04/07/2021   VAZQUEZ cirrhosis with ascites, small esophageal varices and hepatic encephalopathy 04/07/2021   Unilateral recurrent inguinal hernia without obstruction or gangrene 02/10/2021   Last Assessment & Plan:     Formatting of this note might be different from the original   Recommend General surgery consult for inguinal hernia repair  RTC PRN       Dry skin dermatitis 11/23/2020   Leg wound, left, initial encounter 11/16/2020   Localized edema 11/16/2020   Venous insufficiency of both lower extremities 11/16/2020   Decreased pedal pulses 11/16/2020   Localized erythema 11/16/2020   Current moderate episode of major depressive disorder without prior episode 11/11/2020   Gastric polyp 01/09/2019   Overview:     Formatting of this note might be different from the original   Added automatically from request for surgery [id number]     Gastric polyps 09/19/2018   Overview:     Formatting of this note might be different from the original   Added automatically from request for surgery [id number]     Heme + stool 09/19/2018   Overview:     Formatting of this note might be different from the original   Added automatically from request for surgery [id number]     Nephrolithiasis 05/16/2017   Last Assessment & Plan:     Formatting of this note might be different from the original   Right side shows small residual stone; kub/keesha in 6m  General stone prevention diet reviewed  If stone grows, will need full metwu     Pre-op testing 05/16/2017   Overview:     Formatting of this note might be different from the original   Added automatically from request for surgery [id number]     Acquired hypothyroidism 04/20/2017   Type 2 diabetes mellitus without complication, without long-term current use of insulin 04/20/2017   Contact dermatitis and other eczema, due to unspecified cause 04/17/2013   Enlarged prostate with lower urinary tract symptoms (LUTS) 11/17/2011   Esophageal reflux 11/11/2011   Hyperlipidemia 11/11/2011   Essential hypertension 11/11/2011   Sleep apnea 11/11/2011   Resolved Problems    Problem Noted Date Resolved Date   Kidney stone 05/16/2017 08/10/2017   Overview:     Formatting of this note might be different from the original   Added automatically from request for surgery [id number]     Impaired fasting glucose 01/16/2014 09/06/2018       Immunization History   Administered Date(s) Administered    SARS-CoV-2 / COVID-19 mRNA IM (Ferman Epley) 03/10/2021, 04/07/2021    Tdap 09/19/2021     Last Tetanus: Updated today  Family History: Non-contributory  Unable to obtain/limited by none      Meds/Allergies   all current active meds have been reviewed    No Known Allergies      PHYSICAL EXAM    PE limited by: none    Objective   Vitals:   First set: Temperature: 97 7 °F (36 5 °C) (09/19/21 0559)  Pulse: 56 (09/19/21 0515)  Respirations: 18 (09/19/21 0515)  Blood Pressure: 126/59 (09/19/21 0515)    Primary Survey:   (A) Airway: intact  (B) Breathing: CTAB  (C) Circulation: Pulses:   pedal  2/4, radial  2/4 and femoral  2/4  (D) Disabliity:  GCS Total:  15  (E) Expose:  Completed    Secondary Survey: (Click on Physical Exam tab above)  Physical Exam  Vitals and nursing note reviewed  Constitutional:       General: He is not in acute distress  Appearance: He is well-developed  HENT:      Head: Normocephalic   No raccoon eyes, Franco's sign or contusion  Comments: Bruising on R side of face  Bandage over laceration     Right Ear: No hemotympanum  Left Ear: No hemotympanum  Eyes:      Conjunctiva/sclera: Conjunctivae normal       Pupils: Pupils are equal, round, and reactive to light  Cardiovascular:      Rate and Rhythm: Normal rate and regular rhythm  Pulmonary:      Effort: Pulmonary effort is normal       Breath sounds: Normal breath sounds  Chest:      Chest wall: No tenderness  Abdominal:      General: There is no distension  Palpations: Abdomen is soft  Tenderness: There is no abdominal tenderness  Musculoskeletal:         General: Normal range of motion  Right shoulder: No tenderness or bony tenderness  Left shoulder: No tenderness or bony tenderness  Right elbow: No tenderness  Left elbow: No tenderness  Right wrist: No tenderness  Left wrist: No tenderness  Cervical back: Normal range of motion and neck supple  No tenderness or bony tenderness  Thoracic back: Normal  No tenderness or bony tenderness  Lumbar back: Normal  No tenderness or bony tenderness  Right hip: No tenderness or bony tenderness  Left hip: No tenderness or bony tenderness  Right knee: No tenderness  Left knee: No tenderness  Right ankle: No tenderness  Left ankle: No tenderness  Right foot: No tenderness  Left foot: No tenderness  Skin:     General: Skin is warm and dry  Neurological:      Mental Status: He is alert and oriented to person, place, and time  Cranial Nerves: No cranial nerve deficit  Sensory: No sensory deficit  Motor: No abnormal muscle tone        Coordination: Coordination normal    Psychiatric:         Mood and Affect: Mood normal          Invasive Devices     Peripheral Intravenous Line            Peripheral IV 09/19/21 Left Antecubital <1 day                Lab Results:   Results: I have personally reviewed pertinent reports   , BMP/CMP:   Lab Results   Component Value Date    SODIUM 134 09/19/2021    K 4 6 09/19/2021    CL 98 09/19/2021    CO2 28 09/19/2021    BUN 40 (H) 09/19/2021    CREATININE 1 75 (H) 09/19/2021    CALCIUM 9 3 09/19/2021    EGFR 38 09/19/2021    and CBC:   Lab Results   Component Value Date    WBC 8 20 09/19/2021    HGB 9 0 (L) 09/19/2021    HCT 28 5 (L) 09/19/2021    MCV 82 09/19/2021     09/19/2021    MCH 25 8 (L) 09/19/2021    MCHC 31 5 09/19/2021    RDW 18 3 (H) 09/19/2021    MPV 7 3 (L) 09/19/2021     Imaging/EKG Studies: CT Scan Head: SAH/SDH, CT Scan C-Spine: none  Other Studies: None    Code Status: Level 1 - Full Code  Advance Directive and Living Will:      Power of :    POLST:

## 2021-09-19 NOTE — ASSESSMENT & PLAN NOTE
Pt presented s/p fall with small left parafalcine acute SDH and trace SAH in the left paramedial frontal region  · Imaging reviewed personally and by attending  Final results as below  · CT head wo 09/19/2021: Unchanged small left parafalcine acute subdural hematoma  Resolved trace subarachnoid hemorrhage in left paramedian frontal region  Unchanged mild ventriculomegaly out of proportion to the degree of cerebral atrophy  Slightly increased size of small subgaleal hematoma in right frontal region        Plan  · Continue frequent neurological checks  · STAT CT head with any neurological decline or a decrease in GCS of 2pts within 1 hr   · Recommend SBP <160 mmHg  · Seizure prophylaxis per primary team  · Hold/ avoid all antiplatelet and anticoagulation medications  Patient was taking aspirin for heart health  Patient continue to hold aspirin for at least 2 weeks  · Pain control per primary team  · Mobilize and eval by PT/OT when able to  · DVT PPX: SCDs only at this time  Given slight increase in the small right frontal subgaleal collection, continue to hold pharm dvt ppx for another 48 hrs  May repeat [facility] for stability prior to starting pharm dvt ppx if needed  · No neurosurgical intervention is anticipated at this time  · Neurosurgery will see patient p r n  During the remainder of this hospitalization  Patient will have follow-up in 2 weeks repeat CT head  Please call with any questions/concerns

## 2021-09-20 ENCOUNTER — TELEPHONE (OUTPATIENT)
Dept: NEUROSURGERY | Facility: CLINIC | Age: 76
End: 2021-09-20

## 2021-09-20 VITALS
DIASTOLIC BLOOD PRESSURE: 54 MMHG | RESPIRATION RATE: 18 BRPM | TEMPERATURE: 97.9 F | BODY MASS INDEX: 26.57 KG/M2 | HEART RATE: 75 BPM | WEIGHT: 225 LBS | HEIGHT: 77 IN | SYSTOLIC BLOOD PRESSURE: 126 MMHG | OXYGEN SATURATION: 95 %

## 2021-09-20 NOTE — TELEPHONE ENCOUNTER
09/23/2021-CALLED PT, LEFT MESSAGE ON MACHINE CONFIRMING [facility]  WAITING FOR CT TO BE SCHEDULED         09/20/2021-PT DISCHARGED TO HOME  10/05/2021 APT W/CT HEAD      ----- Message from Elizabeth Epstein PA-C sent at 9/19/2021  5:14 PM EDT -----  Regarding: f/u  Hello,     Please help set up 2 week f/u w/CTH w/PA

## 2021-09-24 NOTE — PHYSICIAN ADVISOR
Current patient class: Inpatient  The patient is currently on Hospital Day: 1 at [address] [place] [address]      The patient was admitted to the hospital  on 9/19/21 at  5:26 AM for the following diagnosis:  Multiple injuries [T07  XXXA]  SAH (subarachnoid hemorrhage) ([facility] [address] ) [I60 9]     After review of the relevant documentation, labs, vital signs and test results, this is a PROVIDER LIABLE case  In this particular case the patient was admitted to the hospital as an inpatient  The patient however failed to satisfy the 2 midnight benchmark and closer scrutiny of the case was warranted  After review of the patient presentation and relevant labs the patient was most appropriate for observation or outpatient class at the time of admission  Given that this patient has already been discharged prior to this review they become a provider liable case  Rationale is as follows: The patient is a 78 yrs   Male who presented to the ED at 9/19/2021  5:18 AM with a chief complaint of Fall (Trauma tx fall on asa +headstrike) Patient on aspirin and found to have a small subdural and subarachnoid hemorrhage with facial contusion and abrasions  He was evaluated by neurosurgery and neurologic exam remained stable  Ct head on admission and follow up showed stability  He was discharged home on hospital day 1  The patients vitals on arrival were   ED Triage Vitals   Temperature Pulse Respirations Blood Pressure SpO2   09/19/21 0559 09/19/21 0515 09/19/21 0515 09/19/21 0515 09/19/21 0515   97 7 °F (36 5 °C) 56 18 126/59 98 %      Temp Source Heart Rate Source Patient Position - Orthostatic VS BP Location FiO2 (%)   09/19/21 0559 -- -- -- --   Oral          Pain Score       09/19/21 0830       No Pain           Past Medical History:   Diagnosis Date    Diabetes mellitus ([facility] [address] )     Disease of thyroid gland     Hypertension      History reviewed  No pertinent surgical history          Consults have been placed to:   IP CONSULT TO NEUROSURGERY    Vitals:    09/19/21 0950 09/19/21 1106 09/19/21 1553 09/19/21 2031   BP: 110/57 101/59 120/68 166/72   Pulse:  67 71    Resp:  20 20 (!) 26   Temp: 98 °F (36 7 °C) 97 8 °F (36 6 °C) 97 6 °F (36 4 °C) 100 4 °F (38 °C)   TempSrc:       SpO2: 98% 96% 96%        Most recent labs:    No results for input(s): WBC, HGB, HCT, PLT, K, NA, CALCIUM, BUN, CREATININE, LIPASE, AMYLASE, INR, TROPONINI, CKTOTAL, AST, ALT, ALKPHOS, BILITOT in the last 72 hours  Scheduled Meds:  Continuous Infusions:No current facility-administered medications for this encounter  PRN Meds:      Surgical procedures (if appropriate):

## 2021-09-28 NOTE — TELEPHONE ENCOUNTER
Patients wife called and lm asking to make an appt    Called patient and confirmed appt for 10/5/21 @9;45 and [address] for 10/4/21 at 11;am     Gave patient address to our office as well

## 2021-09-30 LAB
DME PARACHUTE DELIVERY DATE ACTUAL: NORMAL
DME PARACHUTE DELIVERY DATE REQUESTED: NORMAL
DME PARACHUTE ITEM DESCRIPTION: NORMAL
DME PARACHUTE ORDER STATUS: NORMAL
DME PARACHUTE SUPPLIER NAME: NORMAL
DME PARACHUTE SUPPLIER PHONE: NORMAL

## 2021-10-04 ENCOUNTER — HOSPITAL ENCOUNTER (OUTPATIENT)
Dept: CT IMAGING | Facility: HOSPITAL | Age: 76
Discharge: HOME | End: 2021-10-04
Payer: MEDICARE

## 2021-10-04 DIAGNOSIS — I60.9 SAH (SUBARACHNOID HEMORRHAGE) (HCC): ICD-10-CM

## 2021-10-04 DIAGNOSIS — S06.5X9A SDH (SUBDURAL HEMATOMA) (HCC): ICD-10-CM

## 2021-10-04 PROCEDURE — 70450 CT HEAD/BRAIN W/O DYE: CPT

## 2021-10-04 PROCEDURE — G1004 CDSM NDSC: HCPCS

## 2021-10-05 ENCOUNTER — OFFICE VISIT (OUTPATIENT)
Dept: NEUROSURGERY | Facility: CLINIC | Age: 76
End: 2021-10-05
Payer: MEDICARE

## 2021-10-05 VITALS
WEIGHT: 248 LBS | DIASTOLIC BLOOD PRESSURE: 64 MMHG | TEMPERATURE: 97.1 F | BODY MASS INDEX: 29.28 KG/M2 | SYSTOLIC BLOOD PRESSURE: 110 MMHG | HEIGHT: 77 IN | RESPIRATION RATE: 16 BRPM | HEART RATE: 72 BPM

## 2021-10-05 DIAGNOSIS — S06.5X9A SDH (SUBDURAL HEMATOMA) (HCC): Primary | ICD-10-CM

## 2021-10-05 PROCEDURE — 99213 OFFICE O/P EST LOW 20 MIN: CPT | Performed by: PHYSICIAN ASSISTANT

## 2021-10-05 PROCEDURE — 1124F ACP DISCUSS-NO DSCNMKR DOCD: CPT | Performed by: PHYSICIAN ASSISTANT

## 2021-10-05 RX ORDER — MELATONIN
1000 DAILY
COMMUNITY

## 2021-12-19 ENCOUNTER — HOSPITAL ENCOUNTER (INPATIENT)
Facility: HOSPITAL | Age: 76
LOS: 3 days | Discharge: HOME | DRG: 682 | End: 2021-12-22
Attending: EMERGENCY MEDICINE | Admitting: HOSPITALIST
Payer: MEDICARE

## 2021-12-19 ENCOUNTER — APPOINTMENT (EMERGENCY)
Dept: CT IMAGING | Facility: HOSPITAL | Age: 76
DRG: 682 | End: 2021-12-19
Payer: MEDICARE

## 2021-12-19 ENCOUNTER — APPOINTMENT (EMERGENCY)
Dept: RADIOLOGY | Facility: HOSPITAL | Age: 76
DRG: 682 | End: 2021-12-19
Payer: MEDICARE

## 2021-12-19 DIAGNOSIS — N17.9 ACUTE KIDNEY INJURY (HCC): Primary | ICD-10-CM

## 2021-12-19 DIAGNOSIS — E87.2 LACTIC ACIDOSIS: ICD-10-CM

## 2021-12-19 DIAGNOSIS — K72.90 HEPATIC ENCEPHALOPATHY (HCC): ICD-10-CM

## 2021-12-19 DIAGNOSIS — N18.32 ACUTE RENAL FAILURE SUPERIMPOSED ON STAGE 3B CHRONIC KIDNEY DISEASE, UNSPECIFIED ACUTE RENAL FAILURE TYPE (HCC): ICD-10-CM

## 2021-12-19 DIAGNOSIS — R18.8 ASCITES: ICD-10-CM

## 2021-12-19 DIAGNOSIS — K74.60 LIVER CIRRHOSIS (HCC): ICD-10-CM

## 2021-12-19 DIAGNOSIS — N17.9 ACUTE RENAL FAILURE SUPERIMPOSED ON STAGE 3B CHRONIC KIDNEY DISEASE, UNSPECIFIED ACUTE RENAL FAILURE TYPE (HCC): ICD-10-CM

## 2021-12-19 PROBLEM — K31.7 GASTRIC POLYP: Status: ACTIVE | Noted: 2021-12-19

## 2021-12-19 PROBLEM — E87.1 HYPONATREMIA: Status: ACTIVE | Noted: 2021-12-19

## 2021-12-19 PROBLEM — K75.81 LIVER CIRRHOSIS SECONDARY TO NASH (HCC): Status: ACTIVE | Noted: 2021-12-19

## 2021-12-19 PROBLEM — N18.9 ACUTE ON CHRONIC KIDNEY FAILURE (HCC): Status: ACTIVE | Noted: 2021-12-19

## 2021-12-19 PROBLEM — E11.9 DIABETES (HCC): Status: ACTIVE | Noted: 2021-12-19

## 2021-12-19 LAB
2HR DELTA HS TROPONIN: -1 NG/L
ALBUMIN SERPL BCP-MCNC: 3.5 G/DL (ref 3.5–5)
ALP SERPL-CCNC: 149 U/L (ref 34–104)
ALT SERPL W P-5'-P-CCNC: 38 U/L (ref 7–52)
AMMONIA PLAS-SCNC: 70 UMOL/L (ref 18–72)
ANION GAP SERPL CALCULATED.3IONS-SCNC: 11 MMOL/L (ref 4–13)
APTT PPP: 31 SECONDS (ref 23–37)
AST SERPL W P-5'-P-CCNC: 73 U/L (ref 13–39)
BASOPHILS # BLD AUTO: 0.03 THOUSANDS/ΜL (ref 0–0.1)
BASOPHILS NFR BLD AUTO: 0 % (ref 0–1)
BILIRUB SERPL-MCNC: 0.81 MG/DL (ref 0.2–1)
BNP SERPL-MCNC: 29 PG/ML (ref 1–100)
BUN SERPL-MCNC: 46 MG/DL (ref 5–25)
CALCIUM SERPL-MCNC: 9.4 MG/DL (ref 8.4–10.2)
CARDIAC TROPONIN I PNL SERPL HS: 11 NG/L
CARDIAC TROPONIN I PNL SERPL HS: 12 NG/L
CHLORIDE SERPL-SCNC: 93 MMOL/L (ref 96–108)
CO2 SERPL-SCNC: 24 MMOL/L (ref 21–32)
CREAT SERPL-MCNC: 2.56 MG/DL (ref 0.6–1.3)
EOSINOPHIL # BLD AUTO: 0.13 THOUSAND/ΜL (ref 0–0.61)
EOSINOPHIL NFR BLD AUTO: 1 % (ref 0–6)
ERYTHROCYTE [DISTWIDTH] IN BLOOD BY AUTOMATED COUNT: 18.6 % (ref 11.6–15.1)
FLUAV RNA RESP QL NAA+PROBE: NEGATIVE
FLUBV RNA RESP QL NAA+PROBE: NEGATIVE
GFR SERPL CREATININE-BSD FRML MDRD: 23 ML/MIN/1.73SQ M
GLUCOSE SERPL-MCNC: 119 MG/DL (ref 65–140)
HCT VFR BLD AUTO: 27.3 % (ref 36.5–49.3)
HGB BLD-MCNC: 8.1 G/DL (ref 12–17)
IMM GRANULOCYTES # BLD AUTO: 0.1 THOUSAND/UL (ref 0–0.2)
IMM GRANULOCYTES NFR BLD AUTO: 1 % (ref 0–2)
INR PPP: 1.25 (ref 0.84–1.19)
LACTATE SERPL-SCNC: 2.4 MMOL/L (ref 0.5–2)
LACTATE SERPL-SCNC: 3.7 MMOL/L (ref 0.5–2)
LIPASE SERPL-CCNC: 107 U/L (ref 11–82)
LYMPHOCYTES # BLD AUTO: 0.9 THOUSANDS/ΜL (ref 0.6–4.47)
LYMPHOCYTES NFR BLD AUTO: 8 % (ref 14–44)
MAGNESIUM SERPL-MCNC: 2.3 MG/DL (ref 1.9–2.7)
MCH RBC QN AUTO: 23.8 PG (ref 26.8–34.3)
MCHC RBC AUTO-ENTMCNC: 29.7 G/DL (ref 31.4–37.4)
MCV RBC AUTO: 80 FL (ref 82–98)
MONOCYTES # BLD AUTO: 1.49 THOUSAND/ΜL (ref 0.17–1.22)
MONOCYTES NFR BLD AUTO: 13 % (ref 4–12)
NEUTROPHILS # BLD AUTO: 8.49 THOUSANDS/ΜL (ref 1.85–7.62)
NEUTS SEG NFR BLD AUTO: 77 % (ref 43–75)
NRBC BLD AUTO-RTO: 0 /100 WBCS
PLATELET # BLD AUTO: 267 THOUSANDS/UL (ref 149–390)
PMV BLD AUTO: 8.9 FL (ref 8.9–12.7)
POTASSIUM SERPL-SCNC: 5.1 MMOL/L (ref 3.5–5.3)
PROT SERPL-MCNC: 7.1 G/DL (ref 6.4–8.4)
PROTHROMBIN TIME: 15.6 SECONDS (ref 11.6–14.5)
RBC # BLD AUTO: 3.4 MILLION/UL (ref 3.88–5.62)
RSV RNA RESP QL NAA+PROBE: NEGATIVE
SARS-COV-2 RNA RESP QL NAA+PROBE: NEGATIVE
SODIUM SERPL-SCNC: 128 MMOL/L (ref 135–147)
WBC # BLD AUTO: 11.14 THOUSAND/UL (ref 4.31–10.16)

## 2021-12-19 PROCEDURE — 85730 THROMBOPLASTIN TIME PARTIAL: CPT | Performed by: PHYSICIAN ASSISTANT

## 2021-12-19 PROCEDURE — 99285 EMERGENCY DEPT VISIT HI MDM: CPT | Performed by: PHYSICIAN ASSISTANT

## 2021-12-19 PROCEDURE — 83735 ASSAY OF MAGNESIUM: CPT | Performed by: PHYSICIAN ASSISTANT

## 2021-12-19 PROCEDURE — NC001 PR NO CHARGE: Performed by: NURSE PRACTITIONER

## 2021-12-19 PROCEDURE — 85610 PROTHROMBIN TIME: CPT | Performed by: PHYSICIAN ASSISTANT

## 2021-12-19 PROCEDURE — 85025 COMPLETE CBC W/AUTO DIFF WBC: CPT | Performed by: PHYSICIAN ASSISTANT

## 2021-12-19 PROCEDURE — 87040 BLOOD CULTURE FOR BACTERIA: CPT | Performed by: PHYSICIAN ASSISTANT

## 2021-12-19 PROCEDURE — 84484 ASSAY OF TROPONIN QUANT: CPT | Performed by: PHYSICIAN ASSISTANT

## 2021-12-19 PROCEDURE — 99285 EMERGENCY DEPT VISIT HI MDM: CPT

## 2021-12-19 PROCEDURE — 80053 COMPREHEN METABOLIC PANEL: CPT | Performed by: PHYSICIAN ASSISTANT

## 2021-12-19 PROCEDURE — 82140 ASSAY OF AMMONIA: CPT | Performed by: PHYSICIAN ASSISTANT

## 2021-12-19 PROCEDURE — 83605 ASSAY OF LACTIC ACID: CPT | Performed by: PHYSICIAN ASSISTANT

## 2021-12-19 PROCEDURE — 83690 ASSAY OF LIPASE: CPT | Performed by: PHYSICIAN ASSISTANT

## 2021-12-19 PROCEDURE — 0241U HB NFCT DS VIR RESP RNA 4 TRGT: CPT | Performed by: PHYSICIAN ASSISTANT

## 2021-12-19 PROCEDURE — 84145 PROCALCITONIN (PCT): CPT | Performed by: PHYSICIAN ASSISTANT

## 2021-12-19 PROCEDURE — 99223 1ST HOSP IP/OBS HIGH 75: CPT | Performed by: INTERNAL MEDICINE

## 2021-12-19 PROCEDURE — 70450 CT HEAD/BRAIN W/O DYE: CPT

## 2021-12-19 PROCEDURE — 83880 ASSAY OF NATRIURETIC PEPTIDE: CPT | Performed by: PHYSICIAN ASSISTANT

## 2021-12-19 PROCEDURE — 93005 ELECTROCARDIOGRAM TRACING: CPT

## 2021-12-19 PROCEDURE — 36415 COLL VENOUS BLD VENIPUNCTURE: CPT | Performed by: PHYSICIAN ASSISTANT

## 2021-12-19 PROCEDURE — 71045 X-RAY EXAM CHEST 1 VIEW: CPT

## 2021-12-19 PROCEDURE — 99223 1ST HOSP IP/OBS HIGH 75: CPT | Performed by: NURSE PRACTITIONER

## 2021-12-19 RX ORDER — INSULIN GLARGINE 100 [IU]/ML
5 INJECTION, SOLUTION SUBCUTANEOUS
Status: DISCONTINUED | OUTPATIENT
Start: 2021-12-19 | End: 2021-12-22 | Stop reason: HOSPADM

## 2021-12-19 RX ORDER — FERROUS SULFATE 325(65) MG
325 TABLET ORAL
Status: DISCONTINUED | OUTPATIENT
Start: 2021-12-20 | End: 2021-12-22 | Stop reason: HOSPADM

## 2021-12-19 RX ORDER — ESCITALOPRAM OXALATE 10 MG/1
10 TABLET ORAL DAILY
Status: DISCONTINUED | OUTPATIENT
Start: 2021-12-20 | End: 2021-12-22 | Stop reason: HOSPADM

## 2021-12-19 RX ORDER — LACTULOSE 20 G/30ML
30 SOLUTION ORAL 2 TIMES DAILY
Status: DISCONTINUED | OUTPATIENT
Start: 2021-12-19 | End: 2021-12-20

## 2021-12-19 RX ORDER — ONDANSETRON 2 MG/ML
4 INJECTION INTRAMUSCULAR; INTRAVENOUS EVERY 6 HOURS PRN
Status: DISCONTINUED | OUTPATIENT
Start: 2021-12-19 | End: 2021-12-22 | Stop reason: HOSPADM

## 2021-12-19 RX ORDER — LEVOTHYROXINE SODIUM 112 UG/1
112 TABLET ORAL DAILY
Status: DISCONTINUED | OUTPATIENT
Start: 2021-12-20 | End: 2021-12-22 | Stop reason: HOSPADM

## 2021-12-19 RX ORDER — ACETAMINOPHEN 325 MG/1
650 TABLET ORAL EVERY 8 HOURS PRN
Status: DISCONTINUED | OUTPATIENT
Start: 2021-12-19 | End: 2021-12-22 | Stop reason: HOSPADM

## 2021-12-19 RX ORDER — ATORVASTATIN CALCIUM 10 MG/1
20 TABLET, FILM COATED ORAL DAILY
Status: DISCONTINUED | OUTPATIENT
Start: 2021-12-19 | End: 2021-12-22 | Stop reason: HOSPADM

## 2021-12-19 RX ORDER — OCTREOTIDE ACETATE 100 UG/ML
50 INJECTION, SOLUTION INTRAVENOUS; SUBCUTANEOUS ONCE
Status: COMPLETED | OUTPATIENT
Start: 2021-12-19 | End: 2021-12-20

## 2021-12-19 RX ORDER — ALBUMIN (HUMAN) 12.5 G/50ML
25 SOLUTION INTRAVENOUS EVERY 6 HOURS
Status: COMPLETED | OUTPATIENT
Start: 2021-12-19 | End: 2021-12-20

## 2021-12-19 RX ORDER — PANTOPRAZOLE SODIUM 40 MG/1
40 TABLET, DELAYED RELEASE ORAL DAILY
Status: DISCONTINUED | OUTPATIENT
Start: 2021-12-20 | End: 2021-12-22 | Stop reason: HOSPADM

## 2021-12-19 RX ORDER — MIDODRINE HYDROCHLORIDE 5 MG/1
10 TABLET ORAL
Status: DISCONTINUED | OUTPATIENT
Start: 2021-12-19 | End: 2021-12-20

## 2021-12-19 RX ADMIN — RIFAXIMIN 550 MG: 550 TABLET ORAL at 19:52

## 2021-12-19 RX ADMIN — MIDODRINE HYDROCHLORIDE 10 MG: 5 TABLET ORAL at 16:38

## 2021-12-19 RX ADMIN — LACTULOSE 30 G: 10 SOLUTION ORAL at 17:08

## 2021-12-19 RX ADMIN — ALBUMIN (HUMAN) 25 G: 0.25 INJECTION, SOLUTION INTRAVENOUS at 16:28

## 2021-12-19 RX ADMIN — ATORVASTATIN CALCIUM 20 MG: 10 TABLET, FILM COATED ORAL at 16:38

## 2021-12-20 ENCOUNTER — APPOINTMENT (INPATIENT)
Dept: INTERVENTIONAL RADIOLOGY/VASCULAR | Facility: HOSPITAL | Age: 76
DRG: 682 | End: 2021-12-20
Attending: INTERNAL MEDICINE
Payer: MEDICARE

## 2021-12-20 ENCOUNTER — APPOINTMENT (INPATIENT)
Dept: ULTRASOUND IMAGING | Facility: HOSPITAL | Age: 76
DRG: 682 | End: 2021-12-20
Payer: MEDICARE

## 2021-12-20 LAB
4HR DELTA HS TROPONIN: 4 NG/L
ALBUMIN FLD-MCNC: 1.1 G/DL
ALBUMIN SERPL BCP-MCNC: 3.6 G/DL (ref 3.5–5)
ALP SERPL-CCNC: 109 U/L (ref 34–104)
ALT SERPL W P-5'-P-CCNC: 24 U/L (ref 7–52)
ANION GAP SERPL CALCULATED.3IONS-SCNC: 8 MMOL/L (ref 4–13)
APPEARANCE FLD: ABNORMAL
AST SERPL W P-5'-P-CCNC: 34 U/L (ref 13–39)
ATRIAL RATE: 82 BPM
ATRIAL RATE: 89 BPM
BACTERIA UR QL AUTO: ABNORMAL /HPF
BILIRUB SERPL-MCNC: 1.23 MG/DL (ref 0.2–1)
BILIRUB UR QL STRIP: NEGATIVE
BUN SERPL-MCNC: 44 MG/DL (ref 5–25)
CALCIUM SERPL-MCNC: 9.2 MG/DL (ref 8.4–10.2)
CARDIAC TROPONIN I PNL SERPL HS: 16 NG/L
CHLORIDE SERPL-SCNC: 94 MMOL/L (ref 96–108)
CLARITY UR: ABNORMAL
CO2 SERPL-SCNC: 27 MMOL/L (ref 21–32)
COLOR FLD: ABNORMAL
COLOR UR: YELLOW
CREAT SERPL-MCNC: 2.23 MG/DL (ref 0.6–1.3)
CREAT UR-MCNC: 139 MG/DL
GFR SERPL CREATININE-BSD FRML MDRD: 27 ML/MIN/1.73SQ M
GLUCOSE FLD-MCNC: 153 MG/DL
GLUCOSE SERPL-MCNC: 121 MG/DL (ref 65–140)
GLUCOSE SERPL-MCNC: 121 MG/DL (ref 65–140)
GLUCOSE SERPL-MCNC: 152 MG/DL (ref 65–140)
GLUCOSE SERPL-MCNC: 156 MG/DL (ref 65–140)
GLUCOSE SERPL-MCNC: 187 MG/DL (ref 65–140)
GLUCOSE UR STRIP-MCNC: NEGATIVE MG/DL
HGB UR QL STRIP.AUTO: ABNORMAL
HYALINE CASTS #/AREA URNS LPF: ABNORMAL /LPF
KETONES UR STRIP-MCNC: NEGATIVE MG/DL
LDH FLD L TO P-CCNC: 75 U/L
LEUKOCYTE ESTERASE UR QL STRIP: ABNORMAL
LYMPHOCYTES NFR BLD AUTO: 18 %
MONOCYTES NFR BLD AUTO: 78 %
NEUTS SEG NFR BLD AUTO: 4 %
NITRITE UR QL STRIP: NEGATIVE
NON-SQ EPI CELLS URNS QL MICRO: ABNORMAL /HPF
P AXIS: 52 DEGREES
P AXIS: 55 DEGREES
PH UR STRIP.AUTO: 6 [PH]
PHOSPHATE SERPL-MCNC: 3.7 MG/DL (ref 2.3–4.1)
POTASSIUM SERPL-SCNC: 4.7 MMOL/L (ref 3.5–5.3)
PR INTERVAL: 178 MS
PR INTERVAL: 180 MS
PROCALCITONIN SERPL-MCNC: 0.09 NG/ML
PROCALCITONIN SERPL-MCNC: 0.13 NG/ML
PROT FLD-MCNC: <2 G/DL
PROT SERPL-MCNC: 6.2 G/DL (ref 6.4–8.4)
PROT UR STRIP-MCNC: ABNORMAL MG/DL
QRS AXIS: -44 DEGREES
QRS AXIS: -6 DEGREES
QRSD INTERVAL: 94 MS
QRSD INTERVAL: 96 MS
QT INTERVAL: 382 MS
QT INTERVAL: 412 MS
QTC INTERVAL: 464 MS
QTC INTERVAL: 481 MS
RBC #/AREA URNS AUTO: ABNORMAL /HPF
SITE: ABNORMAL
SODIUM 24H UR-SCNC: 20 MOL/L
SODIUM SERPL-SCNC: 129 MMOL/L (ref 135–147)
SP GR UR STRIP.AUTO: 1.02 (ref 1–1.03)
T WAVE AXIS: 37 DEGREES
T WAVE AXIS: 53 DEGREES
TOTAL CELLS COUNTED SPEC: 100
UROBILINOGEN UR QL STRIP.AUTO: 0.2 E.U./DL
UUN 24H UR-MCNC: 683 MG/DL
VENTRICULAR RATE: 82 BPM
VENTRICULAR RATE: 89 BPM
WBC # FLD MANUAL: 173 /UL
WBC #/AREA URNS AUTO: ABNORMAL /HPF

## 2021-12-20 PROCEDURE — 88305 TISSUE EXAM BY PATHOLOGIST: CPT | Performed by: PATHOLOGY

## 2021-12-20 PROCEDURE — 87070 CULTURE OTHR SPECIMN AEROBIC: CPT | Performed by: INTERNAL MEDICINE

## 2021-12-20 PROCEDURE — 84145 PROCALCITONIN (PCT): CPT | Performed by: PHYSICIAN ASSISTANT

## 2021-12-20 PROCEDURE — 84300 ASSAY OF URINE SODIUM: CPT | Performed by: INTERNAL MEDICINE

## 2021-12-20 PROCEDURE — 49083 ABD PARACENTESIS W/IMAGING: CPT

## 2021-12-20 PROCEDURE — 88112 CYTOPATH CELL ENHANCE TECH: CPT | Performed by: PATHOLOGY

## 2021-12-20 PROCEDURE — 84540 ASSAY OF URINE/UREA-N: CPT | Performed by: INTERNAL MEDICINE

## 2021-12-20 PROCEDURE — 89051 BODY FLUID CELL COUNT: CPT | Performed by: INTERNAL MEDICINE

## 2021-12-20 PROCEDURE — 83615 LACTATE (LD) (LDH) ENZYME: CPT | Performed by: INTERNAL MEDICINE

## 2021-12-20 PROCEDURE — 0W9G3ZX DRAINAGE OF PERITONEAL CAVITY, PERCUTANEOUS APPROACH, DIAGNOSTIC: ICD-10-PCS | Performed by: RADIOLOGY

## 2021-12-20 PROCEDURE — 76705 ECHO EXAM OF ABDOMEN: CPT

## 2021-12-20 PROCEDURE — 36415 COLL VENOUS BLD VENIPUNCTURE: CPT | Performed by: PHYSICIAN ASSISTANT

## 2021-12-20 PROCEDURE — 93010 ELECTROCARDIOGRAM REPORT: CPT | Performed by: INTERNAL MEDICINE

## 2021-12-20 PROCEDURE — 82948 REAGENT STRIP/BLOOD GLUCOSE: CPT

## 2021-12-20 PROCEDURE — 80053 COMPREHEN METABOLIC PANEL: CPT | Performed by: INTERNAL MEDICINE

## 2021-12-20 PROCEDURE — 84100 ASSAY OF PHOSPHORUS: CPT | Performed by: INTERNAL MEDICINE

## 2021-12-20 PROCEDURE — 97167 OT EVAL HIGH COMPLEX 60 MIN: CPT

## 2021-12-20 PROCEDURE — 99223 1ST HOSP IP/OBS HIGH 75: CPT | Performed by: INTERNAL MEDICINE

## 2021-12-20 PROCEDURE — 84484 ASSAY OF TROPONIN QUANT: CPT | Performed by: PHYSICIAN ASSISTANT

## 2021-12-20 PROCEDURE — 49083 ABD PARACENTESIS W/IMAGING: CPT | Performed by: RADIOLOGY

## 2021-12-20 PROCEDURE — 82945 GLUCOSE OTHER FLUID: CPT | Performed by: INTERNAL MEDICINE

## 2021-12-20 PROCEDURE — 82042 OTHER SOURCE ALBUMIN QUAN EA: CPT | Performed by: INTERNAL MEDICINE

## 2021-12-20 PROCEDURE — 84157 ASSAY OF PROTEIN OTHER: CPT | Performed by: INTERNAL MEDICINE

## 2021-12-20 PROCEDURE — 99232 SBSQ HOSP IP/OBS MODERATE 35: CPT | Performed by: INTERNAL MEDICINE

## 2021-12-20 PROCEDURE — NC001 PR NO CHARGE: Performed by: RADIOLOGY

## 2021-12-20 PROCEDURE — 97163 PT EVAL HIGH COMPLEX 45 MIN: CPT

## 2021-12-20 PROCEDURE — 87205 SMEAR GRAM STAIN: CPT | Performed by: INTERNAL MEDICINE

## 2021-12-20 PROCEDURE — 82570 ASSAY OF URINE CREATININE: CPT | Performed by: NURSE PRACTITIONER

## 2021-12-20 PROCEDURE — 81001 URINALYSIS AUTO W/SCOPE: CPT | Performed by: PHYSICIAN ASSISTANT

## 2021-12-20 RX ORDER — MIDODRINE HYDROCHLORIDE 5 MG/1
5 TABLET ORAL
Status: DISCONTINUED | OUTPATIENT
Start: 2021-12-21 | End: 2021-12-21

## 2021-12-20 RX ORDER — INSULIN GLARGINE 100 [IU]/ML
INJECTION, SOLUTION SUBCUTANEOUS
Status: COMPLETED
Start: 2021-12-20 | End: 2021-12-20

## 2021-12-20 RX ORDER — LACTULOSE 20 G/30ML
30 SOLUTION ORAL 3 TIMES DAILY
Status: DISCONTINUED | OUTPATIENT
Start: 2021-12-20 | End: 2021-12-22 | Stop reason: HOSPADM

## 2021-12-20 RX ORDER — INSULIN GLARGINE 100 [IU]/ML
INJECTION, SOLUTION SUBCUTANEOUS
Status: DISPENSED
Start: 2021-12-20 | End: 2021-12-21

## 2021-12-20 RX ADMIN — MIDODRINE HYDROCHLORIDE 10 MG: 5 TABLET ORAL at 17:04

## 2021-12-20 RX ADMIN — INSULIN GLARGINE 5 UNITS: 100 INJECTION, SOLUTION SUBCUTANEOUS at 21:59

## 2021-12-20 RX ADMIN — PANTOPRAZOLE SODIUM 40 MG: 40 TABLET, DELAYED RELEASE ORAL at 06:17

## 2021-12-20 RX ADMIN — INSULIN GLARGINE 5 UNITS: 100 INJECTION, SOLUTION SUBCUTANEOUS at 01:04

## 2021-12-20 RX ADMIN — INSULIN LISPRO 1 UNITS: 100 INJECTION, SOLUTION INTRAVENOUS; SUBCUTANEOUS at 17:06

## 2021-12-20 RX ADMIN — ALBUMIN (HUMAN) 25 G: 0.25 INJECTION, SOLUTION INTRAVENOUS at 00:37

## 2021-12-20 RX ADMIN — ESCITALOPRAM OXALATE 10 MG: 10 TABLET ORAL at 08:30

## 2021-12-20 RX ADMIN — RIFAXIMIN 550 MG: 550 TABLET ORAL at 22:00

## 2021-12-20 RX ADMIN — LACTULOSE 30 G: 10 SOLUTION ORAL at 17:05

## 2021-12-20 RX ADMIN — ALBUMIN (HUMAN) 25 G: 0.25 INJECTION, SOLUTION INTRAVENOUS at 08:46

## 2021-12-20 RX ADMIN — RIFAXIMIN 550 MG: 550 TABLET ORAL at 15:20

## 2021-12-20 RX ADMIN — LACTULOSE 30 G: 10 SOLUTION ORAL at 08:34

## 2021-12-20 RX ADMIN — MIDODRINE HYDROCHLORIDE 10 MG: 5 TABLET ORAL at 12:29

## 2021-12-20 RX ADMIN — FERROUS SULFATE TAB 325 MG (65 MG ELEMENTAL FE) 325 MG: 325 (65 FE) TAB at 08:30

## 2021-12-20 RX ADMIN — OCTREOTIDE ACETATE 50 MCG: 100 INJECTION, SOLUTION INTRAVENOUS; SUBCUTANEOUS at 00:42

## 2021-12-20 RX ADMIN — LACTULOSE 30 G: 10 SOLUTION ORAL at 21:59

## 2021-12-20 RX ADMIN — LEVOTHYROXINE SODIUM 112 MCG: 112 TABLET ORAL at 06:19

## 2021-12-20 RX ADMIN — MIDODRINE HYDROCHLORIDE 10 MG: 5 TABLET ORAL at 06:17

## 2021-12-20 RX ADMIN — ATORVASTATIN CALCIUM 20 MG: 10 TABLET, FILM COATED ORAL at 08:29

## 2021-12-20 RX ADMIN — INSULIN LISPRO 1 UNITS: 100 INJECTION, SOLUTION INTRAVENOUS; SUBCUTANEOUS at 12:34

## 2021-12-20 RX ADMIN — ALBUMIN (HUMAN) 25 G: 0.25 INJECTION, SOLUTION INTRAVENOUS at 03:54

## 2021-12-20 RX ADMIN — OCTREOTIDE ACETATE 50 MCG/HR: 500 INJECTION, SOLUTION INTRAVENOUS; SUBCUTANEOUS at 00:45

## 2021-12-21 LAB
ALBUMIN SERPL BCP-MCNC: 4 G/DL (ref 3.5–5)
ALP SERPL-CCNC: 108 U/L (ref 34–104)
ALT SERPL W P-5'-P-CCNC: 24 U/L (ref 7–52)
ANION GAP SERPL CALCULATED.3IONS-SCNC: 8 MMOL/L (ref 4–13)
AST SERPL W P-5'-P-CCNC: 29 U/L (ref 13–39)
BILIRUB SERPL-MCNC: 1.07 MG/DL (ref 0.2–1)
BUN SERPL-MCNC: 42 MG/DL (ref 5–25)
CALCIUM SERPL-MCNC: 9.6 MG/DL (ref 8.4–10.2)
CHLORIDE SERPL-SCNC: 96 MMOL/L (ref 96–108)
CO2 SERPL-SCNC: 28 MMOL/L (ref 21–32)
CREAT SERPL-MCNC: 2.02 MG/DL (ref 0.6–1.3)
ERYTHROCYTE [DISTWIDTH] IN BLOOD BY AUTOMATED COUNT: 18.8 % (ref 11.6–15.1)
GFR SERPL CREATININE-BSD FRML MDRD: 31 ML/MIN/1.73SQ M
GLUCOSE SERPL-MCNC: 124 MG/DL (ref 65–140)
GLUCOSE SERPL-MCNC: 132 MG/DL (ref 65–140)
GLUCOSE SERPL-MCNC: 147 MG/DL (ref 65–140)
GLUCOSE SERPL-MCNC: 187 MG/DL (ref 65–140)
GLUCOSE SERPL-MCNC: 198 MG/DL (ref 65–140)
HCT VFR BLD AUTO: 27 % (ref 36.5–49.3)
HGB BLD-MCNC: 7.9 G/DL (ref 12–17)
INR PPP: 1.26 (ref 0.84–1.19)
MCH RBC QN AUTO: 23.9 PG (ref 26.8–34.3)
MCHC RBC AUTO-ENTMCNC: 29.3 G/DL (ref 31.4–37.4)
MCV RBC AUTO: 82 FL (ref 82–98)
PLATELET # BLD AUTO: 200 THOUSANDS/UL (ref 149–390)
PMV BLD AUTO: 8.7 FL (ref 8.9–12.7)
POTASSIUM SERPL-SCNC: 4.5 MMOL/L (ref 3.5–5.3)
PROT SERPL-MCNC: 7 G/DL (ref 6.4–8.4)
PROTHROMBIN TIME: 15.7 SECONDS (ref 11.6–14.5)
RBC # BLD AUTO: 3.3 MILLION/UL (ref 3.88–5.62)
SODIUM SERPL-SCNC: 132 MMOL/L (ref 135–147)
WBC # BLD AUTO: 8.04 THOUSAND/UL (ref 4.31–10.16)

## 2021-12-21 PROCEDURE — 99232 SBSQ HOSP IP/OBS MODERATE 35: CPT | Performed by: INTERNAL MEDICINE

## 2021-12-21 PROCEDURE — 80053 COMPREHEN METABOLIC PANEL: CPT | Performed by: INTERNAL MEDICINE

## 2021-12-21 PROCEDURE — 82948 REAGENT STRIP/BLOOD GLUCOSE: CPT

## 2021-12-21 PROCEDURE — 85610 PROTHROMBIN TIME: CPT | Performed by: INTERNAL MEDICINE

## 2021-12-21 PROCEDURE — 99232 SBSQ HOSP IP/OBS MODERATE 35: CPT | Performed by: NURSE PRACTITIONER

## 2021-12-21 PROCEDURE — 36415 COLL VENOUS BLD VENIPUNCTURE: CPT | Performed by: INTERNAL MEDICINE

## 2021-12-21 PROCEDURE — 85027 COMPLETE CBC AUTOMATED: CPT | Performed by: INTERNAL MEDICINE

## 2021-12-21 RX ADMIN — PANTOPRAZOLE SODIUM 40 MG: 40 TABLET, DELAYED RELEASE ORAL at 06:15

## 2021-12-21 RX ADMIN — RIFAXIMIN 550 MG: 550 TABLET ORAL at 21:58

## 2021-12-21 RX ADMIN — MIDODRINE HYDROCHLORIDE 5 MG: 5 TABLET ORAL at 06:15

## 2021-12-21 RX ADMIN — FERROUS SULFATE TAB 325 MG (65 MG ELEMENTAL FE) 325 MG: 325 (65 FE) TAB at 08:54

## 2021-12-21 RX ADMIN — LACTULOSE 30 G: 10 SOLUTION ORAL at 08:53

## 2021-12-21 RX ADMIN — LACTULOSE 30 G: 10 SOLUTION ORAL at 21:58

## 2021-12-21 RX ADMIN — RIFAXIMIN 550 MG: 550 TABLET ORAL at 12:14

## 2021-12-21 RX ADMIN — ESCITALOPRAM OXALATE 10 MG: 10 TABLET ORAL at 08:54

## 2021-12-21 RX ADMIN — ATORVASTATIN CALCIUM 20 MG: 10 TABLET, FILM COATED ORAL at 08:54

## 2021-12-21 RX ADMIN — INSULIN LISPRO 2 UNITS: 100 INJECTION, SOLUTION INTRAVENOUS; SUBCUTANEOUS at 12:14

## 2021-12-21 RX ADMIN — LACTULOSE 30 G: 10 SOLUTION ORAL at 16:02

## 2021-12-21 RX ADMIN — INSULIN GLARGINE 5 UNITS: 100 INJECTION, SOLUTION SUBCUTANEOUS at 22:24

## 2021-12-21 RX ADMIN — LEVOTHYROXINE SODIUM 112 MCG: 112 TABLET ORAL at 05:15

## 2021-12-21 RX ADMIN — INSULIN LISPRO 1 UNITS: 100 INJECTION, SOLUTION INTRAVENOUS; SUBCUTANEOUS at 21:59

## 2021-12-22 VITALS
TEMPERATURE: 97.6 F | HEART RATE: 77 BPM | HEIGHT: 77 IN | WEIGHT: 213.41 LBS | RESPIRATION RATE: 18 BRPM | BODY MASS INDEX: 25.2 KG/M2 | DIASTOLIC BLOOD PRESSURE: 65 MMHG | OXYGEN SATURATION: 98 % | SYSTOLIC BLOOD PRESSURE: 108 MMHG

## 2021-12-22 PROBLEM — K72.90 HEPATIC ENCEPHALOPATHY (HCC): Status: RESOLVED | Noted: 2021-12-19 | Resolved: 2021-12-22

## 2021-12-22 PROBLEM — K31.7 GASTRIC POLYP: Status: RESOLVED | Noted: 2021-12-19 | Resolved: 2021-12-22

## 2021-12-22 LAB
ALBUMIN SERPL BCP-MCNC: 3.9 G/DL (ref 3.5–5)
ALP SERPL-CCNC: 107 U/L (ref 34–104)
ALT SERPL W P-5'-P-CCNC: 22 U/L (ref 7–52)
ANION GAP SERPL CALCULATED.3IONS-SCNC: 9 MMOL/L (ref 4–13)
AST SERPL W P-5'-P-CCNC: 27 U/L (ref 13–39)
BILIRUB SERPL-MCNC: 0.89 MG/DL (ref 0.2–1)
BUN SERPL-MCNC: 40 MG/DL (ref 5–25)
CALCIUM SERPL-MCNC: 9.4 MG/DL (ref 8.4–10.2)
CHLORIDE SERPL-SCNC: 96 MMOL/L (ref 96–108)
CO2 SERPL-SCNC: 26 MMOL/L (ref 21–32)
CREAT SERPL-MCNC: 1.95 MG/DL (ref 0.6–1.3)
ERYTHROCYTE [DISTWIDTH] IN BLOOD BY AUTOMATED COUNT: 18.9 % (ref 11.6–15.1)
GFR SERPL CREATININE-BSD FRML MDRD: 32 ML/MIN/1.73SQ M
GLUCOSE SERPL-MCNC: 137 MG/DL (ref 65–140)
GLUCOSE SERPL-MCNC: 141 MG/DL (ref 65–140)
GLUCOSE SERPL-MCNC: 201 MG/DL (ref 65–140)
HCT VFR BLD AUTO: 30.5 % (ref 36.5–49.3)
HGB BLD-MCNC: 8.7 G/DL (ref 12–17)
MCH RBC QN AUTO: 23.1 PG (ref 26.8–34.3)
MCHC RBC AUTO-ENTMCNC: 28.5 G/DL (ref 31.4–37.4)
MCV RBC AUTO: 81 FL (ref 82–98)
PLATELET # BLD AUTO: 245 THOUSANDS/UL (ref 149–390)
PMV BLD AUTO: 8.9 FL (ref 8.9–12.7)
POTASSIUM SERPL-SCNC: 4.1 MMOL/L (ref 3.5–5.3)
PROT SERPL-MCNC: 7.2 G/DL (ref 6.4–8.4)
RBC # BLD AUTO: 3.76 MILLION/UL (ref 3.88–5.62)
SODIUM SERPL-SCNC: 131 MMOL/L (ref 135–147)
WBC # BLD AUTO: 9.51 THOUSAND/UL (ref 4.31–10.16)

## 2021-12-22 PROCEDURE — 80053 COMPREHEN METABOLIC PANEL: CPT | Performed by: INTERNAL MEDICINE

## 2021-12-22 PROCEDURE — 85027 COMPLETE CBC AUTOMATED: CPT | Performed by: INTERNAL MEDICINE

## 2021-12-22 PROCEDURE — 99232 SBSQ HOSP IP/OBS MODERATE 35: CPT | Performed by: NURSE PRACTITIONER

## 2021-12-22 PROCEDURE — 82948 REAGENT STRIP/BLOOD GLUCOSE: CPT

## 2021-12-22 PROCEDURE — 99239 HOSP IP/OBS DSCHRG MGMT >30: CPT | Performed by: INTERNAL MEDICINE

## 2021-12-22 PROCEDURE — 97110 THERAPEUTIC EXERCISES: CPT

## 2021-12-22 PROCEDURE — 36415 COLL VENOUS BLD VENIPUNCTURE: CPT | Performed by: INTERNAL MEDICINE

## 2021-12-22 RX ORDER — LACTULOSE 20 G/30ML
30 SOLUTION ORAL 2 TIMES DAILY
Qty: 1350 ML | Refills: 0 | Status: SHIPPED | OUTPATIENT
Start: 2021-12-22 | End: 2022-01-06

## 2021-12-22 RX ADMIN — LACTULOSE 30 G: 10 SOLUTION ORAL at 08:08

## 2021-12-22 RX ADMIN — INSULIN LISPRO 2 UNITS: 100 INJECTION, SOLUTION INTRAVENOUS; SUBCUTANEOUS at 12:18

## 2021-12-22 RX ADMIN — LEVOTHYROXINE SODIUM 112 MCG: 112 TABLET ORAL at 05:12

## 2021-12-22 RX ADMIN — ESCITALOPRAM OXALATE 10 MG: 10 TABLET ORAL at 08:09

## 2021-12-22 RX ADMIN — PANTOPRAZOLE SODIUM 40 MG: 40 TABLET, DELAYED RELEASE ORAL at 05:12

## 2021-12-22 RX ADMIN — FERROUS SULFATE TAB 325 MG (65 MG ELEMENTAL FE) 325 MG: 325 (65 FE) TAB at 08:09

## 2021-12-22 RX ADMIN — RIFAXIMIN 550 MG: 550 TABLET ORAL at 08:09

## 2021-12-22 RX ADMIN — ATORVASTATIN CALCIUM 20 MG: 10 TABLET, FILM COATED ORAL at 08:09

## 2021-12-23 LAB
BACTERIA SPEC BFLD CULT: NO GROWTH
GRAM STN SPEC: NORMAL
GRAM STN SPEC: NORMAL

## 2021-12-25 LAB
BACTERIA BLD CULT: NORMAL
BACTERIA BLD CULT: NORMAL

## 2022-01-17 ENCOUNTER — OFFICE VISIT (OUTPATIENT)
Dept: NEPHROLOGY | Facility: CLINIC | Age: 77
End: 2022-01-17
Payer: MEDICARE

## 2022-01-17 VITALS
SYSTOLIC BLOOD PRESSURE: 132 MMHG | WEIGHT: 226.6 LBS | HEART RATE: 84 BPM | DIASTOLIC BLOOD PRESSURE: 70 MMHG | BODY MASS INDEX: 26.87 KG/M2 | OXYGEN SATURATION: 99 %

## 2022-01-17 DIAGNOSIS — K72.90 HEPATIC ENCEPHALOPATHY (HCC): ICD-10-CM

## 2022-01-17 DIAGNOSIS — N18.30 CKD (CHRONIC KIDNEY DISEASE) STAGE 3, GFR 30-59 ML/MIN (HCC): ICD-10-CM

## 2022-01-17 DIAGNOSIS — R60.0 BILATERAL LOWER EXTREMITY EDEMA: ICD-10-CM

## 2022-01-17 DIAGNOSIS — K75.81 LIVER CIRRHOSIS SECONDARY TO NASH (HCC): ICD-10-CM

## 2022-01-17 DIAGNOSIS — E11.22 TYPE 2 DIABETES MELLITUS WITH STAGE 3B CHRONIC KIDNEY DISEASE, WITHOUT LONG-TERM CURRENT USE OF INSULIN (HCC): ICD-10-CM

## 2022-01-17 DIAGNOSIS — K74.60 LIVER CIRRHOSIS SECONDARY TO NASH (HCC): ICD-10-CM

## 2022-01-17 DIAGNOSIS — N17.9 AKI (ACUTE KIDNEY INJURY) (HCC): Primary | ICD-10-CM

## 2022-01-17 DIAGNOSIS — N18.32 TYPE 2 DIABETES MELLITUS WITH STAGE 3B CHRONIC KIDNEY DISEASE, WITHOUT LONG-TERM CURRENT USE OF INSULIN (HCC): ICD-10-CM

## 2022-01-17 PROCEDURE — 99214 OFFICE O/P EST MOD 30 MIN: CPT | Performed by: NURSE PRACTITIONER

## 2022-01-17 RX ORDER — FUROSEMIDE 40 MG/1
40 TABLET ORAL DAILY
Qty: 90 TABLET | Refills: 3 | Status: SHIPPED | OUTPATIENT
Start: 2022-01-17

## 2022-01-17 RX ORDER — FUROSEMIDE 40 MG/1
40 TABLET ORAL EVERY OTHER DAY
COMMUNITY
End: 2022-01-17 | Stop reason: SDUPTHER

## 2022-01-17 NOTE — PATIENT INSTRUCTIONS
Increase lasix 40 mg daily     Blood work 2 weeks after dose increase (approximately end of January)   Low sodium diet- less than 2,000 mg per day  Weigh every day with an empty bladder and write down weights and bring to appointments  Continue fluid restriction as you are doing  Hold lasix on days you get paracentesis "tapped"  If diarrhea increases, call this office to see if lasix dose needs to be decreased   I have referred you to Palliative Care to help you with symptoms of advanced liver disease

## 2022-01-17 NOTE — PROGRESS NOTES
Nephrology   Office [address] [facility] Mehrdad  78 y o  male MRN: [id number]    Encounter: [id number]        [address] Funk  was seen in the [place] office today  All diagnoses and orders for visit:     1  BROOKS (acute kidney injury) ([facility] [address] )  · Unresolved from hospital stay, however, renal function noted to progress since [facility] in 2021  Peak sCr 2 6 mg/dL -> 2 0 mg/dL on outpatient lab work  Increase lasix to 40 mg daily and repeat blood work in 2 weeks  Is at risk for recurrent hepatorenal syndrome however volume overload state takes precedence  Will follow closely  Can consider ultrasound if renal function fails to improve  2  CKD (chronic kidney disease) stage 3, GFR 30-59 ml/min ([facility])  · Baseline sCr 1 1-1 3 mg/dL prior to [facility] as mentioned above  No imaging to review but will consider if BROOKS persists despite normal volume status  3  Bilateral lower extremity edema  · Increase lasix to 40 mg daily and check duplex  Continue to focus on low sodium diet and fluid restriction    -     furosemide (LASIX) 40 mg tablet; Take 1 tablet (40 mg total) by mouth daily   -     VAS lower limb venous duplex study, complete bilateral; Future; Expected date: 01/17/2022  4  Liver cirrhosis secondary to [facility]), esophageal varices, recurrent ascites  · Follows with Dr Shawn Huynh  Per patient report, not a transplant candidate  q2week paracentesis  Ideally on lasix + spironolactone but borderline hyperkalemic  Increase lasix to 40 mg daily  Refer to Palliative care   -     Ambulatory Referral to Palliative Care; Future  5  Hepatic encephalopathy ([facility] [address] )  · Admits to feeling confused  Lactulose decreased by other provider  Check ammonia with next labs  avoid hypokalemia    -     Ammonia; Future; Expected date: 01/31/2022  6  Type 2 diabetes mellitus with stage 3b chronic kidney disease, without long-term current use of insulin ([facility] [address] )  7   Hypervolemic hyponatremia  · Continue fluid and sodium restriction, loop diuretic    HPI: Mirian Fernandez  is a 78 y o  male who is here for hospital follow-up  Pertinent medical problems include VAZQUEZ cirrhosis with ascites, esophageal varices and hepatic encephalopathy, type 2 diabetes mellitus, CKD 3, subdural hematoma September of 2021  Lefty Gonzalez was hospitalized at [address] [place] [address] in December for weakness and treated for BROOKS secondary to hepatorenal syndrome, HE  Diuretics were held  Peak sCr 1 56 mg/dL-> 1 95 mg/dL on day of discharge, 2 0 mg/dL as outpatient  Patient presents to office with son-in-law  Son-in-law provides most of HPI  Patient is undergoing paracentesis every 2 weeks, last 1/10/22  Per VAZQUEZ report, lasix restarted every other day  No spironolactone  Patient is trying to fluid and sodium restrict  Lactulose was decreased due to frequent diarrhea  Had a evie discussion with patient and VAZQUEZ regarding prognosis  Patient tells me he is not a candidate for liver transplant  Patient still wants treatment but understands he will not be cured  I discussed the possibility of palliative referral and he was agreeable  Patient remains in BROOKS versus progression of underlying chronic kidney disease  He is at risk for recurrent HRS however he does examine volume overloaded  Will trial increasing lasix back to 40 mg daily and repeat blood work at the end of January  Okay to hold on days of frequent diarrhea and on days of paracentesis  He will also be referred for duplex as LE edema is asymmetrical  He will also see palliative care provider to assist with goals of care discussion  Return to office in 2 months with primary nephrologist      ROS:   Review of Systems   Constitutional: Negative for chills and fever  HENT: Negative for ear pain and sore throat  Eyes: Negative for pain and visual disturbance  Respiratory: Negative for cough and shortness of breath  Cardiovascular: Positive for leg swelling  Negative for chest pain and palpitations     Gastrointestinal: Negative for abdominal pain and vomiting  Genitourinary: Negative for dysuria and hematuria  Musculoskeletal: Negative for arthralgias and back pain  Skin: Negative for color change and rash  Neurological: Negative for seizures and syncope  Psychiatric/Behavioral: Positive for confusion  All other systems reviewed and are negative  Allergies: Patient has no known allergies      Medications:   Current Outpatient Medications:     acetaminophen (TYLENOL) 325 mg tablet, Take 2 tablets (650 mg total) by mouth every 4 (four) hours as needed for mild pain, Disp: , Rfl: 0    Ascorbic Acid (vitamin C) 1000 MG tablet, Take 1,000 mg by mouth daily, Disp: , Rfl:     atorvastatin (LIPITOR) 20 mg tablet, Take 20 mg by mouth daily, Disp: , Rfl:     cholecalciferol (VITAMIN D3) 1,000 units tablet, Take 1,000 Units by mouth daily, Disp: , Rfl:     docusate sodium (COLACE) 100 mg capsule, Take 100 mg by mouth 2 (two) times a day, Disp: , Rfl:     escitalopram (LEXAPRO) 10 mg tablet, Take 10 mg by mouth daily, Disp: , Rfl:     furosemide (LASIX) 40 mg tablet, Take 1 tablet (40 mg total) by mouth daily, Disp: 90 tablet, Rfl: 3    levothyroxine (Euthyrox) 112 mcg tablet, Take 112 mcg by mouth daily, Disp: , Rfl:     pantoprazole (PROTONIX) 40 mg tablet, Take 40 mg by mouth daily, Disp: , Rfl:     rifaximin (XIFAXAN) 550 mg tablet, Take 550 mg by mouth every 12 (twelve) hours, Disp: , Rfl:     ferrous sulfate 325 (65 Fe) mg tablet, Take 325 mg by mouth daily with breakfast (Patient not taking: Reported on 1/17/2022 ), Disp: , Rfl:     lactulose 20 g/30 mL, Take 45 mL (30 g total) by mouth 2 (two) times a day for 30 doses Titrate to 2-3 bowel movements per day Dispense 30 day quantity, Disp: 1350 mL, Rfl: 0    metoprolol succinate (TOPROL-XL) 100 mg 24 hr tablet, Take 100 mg by mouth daily, Disp: , Rfl:     Past Medical History:   Diagnosis Date    Diabetes mellitus ([facility] [address] )     Disease of thyroid gland     Hypertension      Past Surgical History:   Procedure Laterality Date    IR PARACENTESIS  12/20/2021     History reviewed  No pertinent family history  reports that he has never smoked  He has never used smokeless tobacco  He reports previous alcohol use  He reports that he does not use drugs  Physical Exam:   Vitals:    01/17/22 1338   BP: 132/70   Pulse: 84   SpO2: 99%   Weight: 103 kg (226 lb 9 6 oz)     Body mass index is 26 87 kg/m²  General: conscious, cooperative, in no acute distress, appears stated age  Eyes: conjunctivae pale, anicteric sclerae  ENT: lips and mucous membranes moist  Neck: supple, no JVD, no masses  Chest:  essentially clear breath sounds bilaterally, no crackles, ronchus or wheezings  CVS: S1 & S2, normal rate, regular rhythm  Abdomen: soft, non-tender, distended, normoactive bowel sounds, rounded  Extremities: severe right greater than left edema of both legs  Skin: no rash   Neuro: awake, alert, oriented, forgetful      Diagnostic Data:  Lab: I have personally reviewed pertinent lab results  ,   CBC:       CMP: No results found for: SODIUM, K, CL, CO2, ANIONGAP, BUN, CREATININE, GLUCOSE, CALCIUM, AST, ALT, ALKPHOS, PROT, BILITOT, EGFR,   PT/INR: No results found for: PT, INR,   Magnesium: No components found for: MAG,  Phosphorous: No results found for: PHOS    Patient Instructions   Increase lasix 40 mg daily  Blood work 2 weeks after dose increase (approximately end of January)   Low sodium diet- less than 2,000 mg per day  Weigh every day with an empty bladder and write down weights and bring to appointments  Continue fluid restriction as you are doing  Hold lasix on days you get paracentesis "tapped"  If diarrhea increases, call this office to see if lasix dose needs to be decreased   I have referred you to Palliative Care to help you with symptoms of advanced liver disease      Portions of the record may have been created with voice recognition software   Occasional wrong word or "sound a like" substitutions may have occurred due to the inherent limitations of voice recognition software  Read the chart carefully and recognize, using context, where substitutions have occurred  If you have any questions, please contact the dictating provider

## 2022-01-19 ENCOUNTER — HOSPITAL ENCOUNTER (OUTPATIENT)
Dept: NON INVASIVE DIAGNOSTICS | Facility: HOSPITAL | Age: 77
Discharge: HOME | End: 2022-01-19
Payer: MEDICARE

## 2022-01-19 DIAGNOSIS — R60.0 BILATERAL LOWER EXTREMITY EDEMA: ICD-10-CM

## 2022-01-19 PROCEDURE — 93970 EXTREMITY STUDY: CPT | Performed by: SURGERY

## 2022-01-19 PROCEDURE — 93970 EXTREMITY STUDY: CPT

## 2022-01-31 ENCOUNTER — TELEPHONE (OUTPATIENT)
Dept: NEPHROLOGY | Facility: CLINIC | Age: 77
End: 2022-01-31

## 2022-01-31 NOTE — TELEPHONE ENCOUNTER
From my perspective he does not have to get that done but they should check with his gastroenterologist/hepatologist

## 2022-01-31 NOTE — TELEPHONE ENCOUNTER
Call from patients wife checking to see if he still needs to go for ammonia test   She states he was in hospital recently for paracentesis and thinks they may have done the ammonia test  ( I didn't see that it was done)

## 2022-01-31 NOTE — TELEPHONE ENCOUNTER
Called and spoke with Justin Pozo, she is aware that from EVGENY Wise's perspective he does not have to get that done but they should check with his gastroenterologist/hepatologist     She will contact  gastroenterologist and hepatologist